# Patient Record
Sex: MALE | Race: BLACK OR AFRICAN AMERICAN | Employment: OTHER | URBAN - METROPOLITAN AREA
[De-identification: names, ages, dates, MRNs, and addresses within clinical notes are randomized per-mention and may not be internally consistent; named-entity substitution may affect disease eponyms.]

---

## 2017-05-08 ENCOUNTER — HOSPITAL ENCOUNTER (INPATIENT)
Age: 27
LOS: 11 days | Discharge: HOME | DRG: 750 | End: 2017-05-19
Attending: EMERGENCY MEDICINE | Admitting: PSYCHIATRY & NEUROLOGY
Payer: MEDICAID

## 2017-05-08 DIAGNOSIS — F20.9 SCHIZOPHRENIA, UNSPECIFIED TYPE (HCC): Primary | ICD-10-CM

## 2017-05-08 PROCEDURE — 99285 EMERGENCY DEPT VISIT HI MDM: CPT

## 2017-05-08 PROCEDURE — 6370000000 HC RX 637 (ALT 250 FOR IP): Performed by: PSYCHIATRY & NEUROLOGY

## 2017-05-08 PROCEDURE — 1240000000 HC EMOTIONAL WELLNESS R&B

## 2017-05-08 RX ORDER — TRAZODONE HYDROCHLORIDE 150 MG/1
150 TABLET ORAL NIGHTLY
Status: DISCONTINUED | OUTPATIENT
Start: 2017-05-08 | End: 2017-05-19 | Stop reason: HOSPADM

## 2017-05-08 RX ORDER — QUETIAPINE FUMARATE 100 MG/1
100 TABLET, FILM COATED ORAL NIGHTLY
COMMUNITY
End: 2017-07-19

## 2017-05-08 RX ORDER — ACETAMINOPHEN 325 MG/1
650 TABLET ORAL EVERY 6 HOURS PRN
Status: DISCONTINUED | OUTPATIENT
Start: 2017-05-08 | End: 2017-05-19 | Stop reason: HOSPADM

## 2017-05-08 RX ORDER — BENZTROPINE MESYLATE 1 MG/1
1 TABLET ORAL 2 TIMES DAILY
Status: ON HOLD | COMMUNITY
End: 2017-07-27 | Stop reason: HOSPADM

## 2017-05-08 RX ORDER — BENZTROPINE MESYLATE 1 MG/ML
2 INJECTION INTRAMUSCULAR; INTRAVENOUS DAILY PRN
Status: DISCONTINUED | OUTPATIENT
Start: 2017-05-08 | End: 2017-05-19 | Stop reason: HOSPADM

## 2017-05-08 RX ORDER — QUETIAPINE FUMARATE 100 MG/1
100 TABLET, FILM COATED ORAL NIGHTLY
Status: DISCONTINUED | OUTPATIENT
Start: 2017-05-08 | End: 2017-05-10

## 2017-05-08 RX ORDER — TRAZODONE HYDROCHLORIDE 150 MG/1
150 TABLET ORAL NIGHTLY
Status: ON HOLD | COMMUNITY
End: 2017-07-27 | Stop reason: HOSPADM

## 2017-05-08 RX ORDER — HYDROXYZINE HYDROCHLORIDE 25 MG/1
50 TABLET, FILM COATED ORAL 3 TIMES DAILY PRN
Status: DISPENSED | OUTPATIENT
Start: 2017-05-08 | End: 2017-05-11

## 2017-05-08 RX ORDER — MAGNESIUM HYDROXIDE/ALUMINUM HYDROXICE/SIMETHICONE 120; 1200; 1200 MG/30ML; MG/30ML; MG/30ML
30 SUSPENSION ORAL 2 TIMES DAILY PRN
Status: DISCONTINUED | OUTPATIENT
Start: 2017-05-08 | End: 2017-05-19 | Stop reason: HOSPADM

## 2017-05-08 RX ORDER — CITALOPRAM 20 MG/1
20 TABLET ORAL DAILY
Status: DISCONTINUED | OUTPATIENT
Start: 2017-05-08 | End: 2017-05-11

## 2017-05-08 RX ORDER — BENZTROPINE MESYLATE 1 MG/1
1 TABLET ORAL 2 TIMES DAILY
Status: DISCONTINUED | OUTPATIENT
Start: 2017-05-08 | End: 2017-05-11

## 2017-05-08 RX ORDER — CITALOPRAM 40 MG/1
40 TABLET ORAL DAILY
COMMUNITY
End: 2017-07-19

## 2017-05-08 RX ADMIN — TRAZODONE HYDROCHLORIDE 150 MG: 150 TABLET ORAL at 21:02

## 2017-05-08 RX ADMIN — BENZTROPINE MESYLATE 1 MG: 1 TABLET ORAL at 21:02

## 2017-05-08 RX ADMIN — HYDROXYZINE HYDROCHLORIDE 50 MG: 25 TABLET, FILM COATED ORAL at 21:02

## 2017-05-08 RX ADMIN — QUETIAPINE FUMARATE 100 MG: 100 TABLET, FILM COATED ORAL at 21:02

## 2017-05-08 ASSESSMENT — ENCOUNTER SYMPTOMS
SHORTNESS OF BREATH: 0
VOMITING: 0
NAUSEA: 0
ABDOMINAL PAIN: 0

## 2017-05-08 ASSESSMENT — SLEEP AND FATIGUE QUESTIONNAIRES
AVERAGE NUMBER OF SLEEP HOURS: 8
DO YOU HAVE DIFFICULTY SLEEPING: NO
DO YOU USE A SLEEP AID: NO

## 2017-05-08 ASSESSMENT — LIFESTYLE VARIABLES: HISTORY_ALCOHOL_USE: NO

## 2017-05-09 PROCEDURE — 6370000000 HC RX 637 (ALT 250 FOR IP): Performed by: PSYCHIATRY & NEUROLOGY

## 2017-05-09 PROCEDURE — 1240000000 HC EMOTIONAL WELLNESS R&B

## 2017-05-09 RX ADMIN — QUETIAPINE FUMARATE 100 MG: 100 TABLET, FILM COATED ORAL at 20:47

## 2017-05-09 RX ADMIN — BENZTROPINE MESYLATE 1 MG: 1 TABLET ORAL at 20:47

## 2017-05-09 RX ADMIN — TRAZODONE HYDROCHLORIDE 150 MG: 150 TABLET ORAL at 20:47

## 2017-05-09 RX ADMIN — HYDROXYZINE HYDROCHLORIDE 50 MG: 25 TABLET, FILM COATED ORAL at 20:47

## 2017-05-10 PROCEDURE — 6370000000 HC RX 637 (ALT 250 FOR IP): Performed by: PSYCHIATRY & NEUROLOGY

## 2017-05-10 PROCEDURE — 1240000000 HC EMOTIONAL WELLNESS R&B

## 2017-05-10 RX ORDER — QUETIAPINE FUMARATE 200 MG/1
200 TABLET, FILM COATED ORAL NIGHTLY
Status: DISCONTINUED | OUTPATIENT
Start: 2017-05-10 | End: 2017-05-11

## 2017-05-10 RX ADMIN — HYDROXYZINE HYDROCHLORIDE 50 MG: 25 TABLET, FILM COATED ORAL at 22:02

## 2017-05-10 RX ADMIN — BENZTROPINE MESYLATE 1 MG: 1 TABLET ORAL at 22:02

## 2017-05-10 RX ADMIN — QUETIAPINE FUMARATE 200 MG: 200 TABLET, FILM COATED ORAL at 22:02

## 2017-05-10 RX ADMIN — TRAZODONE HYDROCHLORIDE 150 MG: 150 TABLET ORAL at 22:02

## 2017-05-11 PROCEDURE — 1240000000 HC EMOTIONAL WELLNESS R&B

## 2017-05-11 PROCEDURE — 6370000000 HC RX 637 (ALT 250 FOR IP): Performed by: PSYCHIATRY & NEUROLOGY

## 2017-05-11 RX ORDER — BENZTROPINE MESYLATE 1 MG/1
1 TABLET ORAL NIGHTLY
Status: DISCONTINUED | OUTPATIENT
Start: 2017-05-12 | End: 2017-05-19 | Stop reason: HOSPADM

## 2017-05-11 RX ORDER — QUETIAPINE FUMARATE 300 MG/1
300 TABLET, FILM COATED ORAL NIGHTLY
Status: DISCONTINUED | OUTPATIENT
Start: 2017-05-11 | End: 2017-05-14

## 2017-05-11 RX ADMIN — QUETIAPINE FUMARATE 300 MG: 300 TABLET, FILM COATED ORAL at 21:33

## 2017-05-11 RX ADMIN — TRAZODONE HYDROCHLORIDE 150 MG: 150 TABLET ORAL at 21:33

## 2017-05-12 PROCEDURE — 6370000000 HC RX 637 (ALT 250 FOR IP): Performed by: PSYCHIATRY & NEUROLOGY

## 2017-05-12 PROCEDURE — 1240000000 HC EMOTIONAL WELLNESS R&B

## 2017-05-12 RX ADMIN — QUETIAPINE FUMARATE 300 MG: 300 TABLET, FILM COATED ORAL at 20:44

## 2017-05-12 RX ADMIN — BENZTROPINE MESYLATE 1 MG: 1 TABLET ORAL at 20:44

## 2017-05-13 PROCEDURE — 6370000000 HC RX 637 (ALT 250 FOR IP): Performed by: PSYCHIATRY & NEUROLOGY

## 2017-05-13 PROCEDURE — 1240000000 HC EMOTIONAL WELLNESS R&B

## 2017-05-13 RX ADMIN — QUETIAPINE FUMARATE 300 MG: 300 TABLET, FILM COATED ORAL at 20:30

## 2017-05-13 RX ADMIN — TRAZODONE HYDROCHLORIDE 150 MG: 150 TABLET ORAL at 20:30

## 2017-05-13 RX ADMIN — BENZTROPINE MESYLATE 1 MG: 1 TABLET ORAL at 20:30

## 2017-05-14 PROCEDURE — 1240000000 HC EMOTIONAL WELLNESS R&B

## 2017-05-14 PROCEDURE — 6370000000 HC RX 637 (ALT 250 FOR IP): Performed by: PSYCHIATRY & NEUROLOGY

## 2017-05-14 RX ORDER — HALOPERIDOL 5 MG
10 TABLET ORAL 2 TIMES DAILY PRN
Status: DISCONTINUED | OUTPATIENT
Start: 2017-05-14 | End: 2017-05-19 | Stop reason: HOSPADM

## 2017-05-14 RX ORDER — HALOPERIDOL 5 MG/ML
10 INJECTION INTRAMUSCULAR 2 TIMES DAILY PRN
Status: DISCONTINUED | OUTPATIENT
Start: 2017-05-14 | End: 2017-05-19 | Stop reason: HOSPADM

## 2017-05-14 RX ORDER — QUETIAPINE FUMARATE 200 MG/1
400 TABLET, FILM COATED ORAL NIGHTLY
Status: DISCONTINUED | OUTPATIENT
Start: 2017-05-14 | End: 2017-05-19 | Stop reason: HOSPADM

## 2017-05-14 RX ADMIN — QUETIAPINE FUMARATE 400 MG: 200 TABLET, FILM COATED ORAL at 21:35

## 2017-05-14 RX ADMIN — ACETAMINOPHEN 650 MG: 325 TABLET, FILM COATED ORAL at 21:35

## 2017-05-14 RX ADMIN — BENZTROPINE MESYLATE 1 MG: 1 TABLET ORAL at 21:36

## 2017-05-14 RX ADMIN — TRAZODONE HYDROCHLORIDE 150 MG: 150 TABLET ORAL at 21:36

## 2017-05-14 ASSESSMENT — PAIN SCALES - GENERAL
PAINLEVEL_OUTOF10: 2
PAINLEVEL_OUTOF10: 0

## 2017-05-15 PROCEDURE — 6370000000 HC RX 637 (ALT 250 FOR IP): Performed by: PSYCHIATRY & NEUROLOGY

## 2017-05-15 PROCEDURE — 1240000000 HC EMOTIONAL WELLNESS R&B

## 2017-05-15 RX ADMIN — QUETIAPINE FUMARATE 400 MG: 200 TABLET, FILM COATED ORAL at 22:13

## 2017-05-15 RX ADMIN — BENZTROPINE MESYLATE 1 MG: 1 TABLET ORAL at 22:16

## 2017-05-15 RX ADMIN — TRAZODONE HYDROCHLORIDE 150 MG: 150 TABLET ORAL at 22:13

## 2017-05-16 PROCEDURE — 6360000002 HC RX W HCPCS: Performed by: PSYCHIATRY & NEUROLOGY

## 2017-05-16 PROCEDURE — 1240000000 HC EMOTIONAL WELLNESS R&B

## 2017-05-16 RX ADMIN — HALOPERIDOL LACTATE 10 MG: 5 INJECTION, SOLUTION INTRAMUSCULAR at 22:19

## 2017-05-17 PROCEDURE — 1240000000 HC EMOTIONAL WELLNESS R&B

## 2017-05-17 PROCEDURE — 6370000000 HC RX 637 (ALT 250 FOR IP): Performed by: PSYCHIATRY & NEUROLOGY

## 2017-05-17 RX ADMIN — BENZTROPINE MESYLATE 1 MG: 1 TABLET ORAL at 22:21

## 2017-05-17 RX ADMIN — TRAZODONE HYDROCHLORIDE 150 MG: 150 TABLET ORAL at 22:21

## 2017-05-17 RX ADMIN — QUETIAPINE FUMARATE 400 MG: 200 TABLET, FILM COATED ORAL at 22:21

## 2017-05-17 RX ADMIN — ACETAMINOPHEN 650 MG: 325 TABLET, FILM COATED ORAL at 22:21

## 2017-05-17 ASSESSMENT — PAIN SCALES - GENERAL
PAINLEVEL_OUTOF10: 3
PAINLEVEL_OUTOF10: 0

## 2017-05-18 VITALS
OXYGEN SATURATION: 99 % | RESPIRATION RATE: 14 BRPM | WEIGHT: 150 LBS | BODY MASS INDEX: 17.71 KG/M2 | HEART RATE: 104 BPM | DIASTOLIC BLOOD PRESSURE: 89 MMHG | SYSTOLIC BLOOD PRESSURE: 144 MMHG | TEMPERATURE: 98.4 F | HEIGHT: 77 IN

## 2017-05-18 LAB
AMPHETAMINE SCREEN URINE: NEGATIVE
BARBITURATE SCREEN URINE: NEGATIVE
BENZODIAZEPINE SCREEN, URINE: NEGATIVE
BILIRUBIN URINE: NEGATIVE
BUPRENORPHINE URINE: NORMAL
CANNABINOID SCREEN URINE: NEGATIVE
COCAINE METABOLITE, URINE: NEGATIVE
COLOR: YELLOW
COMMENT UA: NORMAL
GLUCOSE URINE: NEGATIVE
KETONES, URINE: NEGATIVE
LEUKOCYTE ESTERASE, URINE: NEGATIVE
MDMA URINE: NORMAL
METHADONE SCREEN, URINE: NEGATIVE
METHAMPHETAMINE, URINE: NORMAL
NITRITE, URINE: NEGATIVE
OPIATES, URINE: NEGATIVE
OXYCODONE SCREEN URINE: NEGATIVE
PH UA: 7 (ref 5–8)
PHENCYCLIDINE, URINE: NEGATIVE
PROPOXYPHENE, URINE: NORMAL
PROTEIN UA: NEGATIVE
SPECIFIC GRAVITY UA: 1.01 (ref 1–1.03)
TEST INFORMATION: NORMAL
TRICYCLIC ANTIDEPRESSANTS, UR: NORMAL
TURBIDITY: CLEAR
URINE HGB: NEGATIVE
UROBILINOGEN, URINE: NORMAL

## 2017-05-18 PROCEDURE — 6370000000 HC RX 637 (ALT 250 FOR IP): Performed by: PSYCHIATRY & NEUROLOGY

## 2017-05-18 PROCEDURE — 81003 URINALYSIS AUTO W/O SCOPE: CPT

## 2017-05-18 PROCEDURE — 1240000000 HC EMOTIONAL WELLNESS R&B

## 2017-05-18 PROCEDURE — 80307 DRUG TEST PRSMV CHEM ANLYZR: CPT

## 2017-05-18 RX ADMIN — BENZTROPINE MESYLATE 1 MG: 1 TABLET ORAL at 21:01

## 2017-05-18 RX ADMIN — QUETIAPINE FUMARATE 400 MG: 200 TABLET, FILM COATED ORAL at 21:01

## 2017-05-18 RX ADMIN — TRAZODONE HYDROCHLORIDE 150 MG: 150 TABLET ORAL at 21:01

## 2017-07-17 ENCOUNTER — APPOINTMENT (OUTPATIENT)
Dept: GENERAL RADIOLOGY | Age: 27
End: 2017-07-17
Payer: COMMERCIAL

## 2017-07-17 ENCOUNTER — HOSPITAL ENCOUNTER (EMERGENCY)
Age: 27
Discharge: HOME | End: 2017-07-18
Attending: EMERGENCY MEDICINE
Payer: COMMERCIAL

## 2017-07-17 VITALS
TEMPERATURE: 97.3 F | HEART RATE: 91 BPM | RESPIRATION RATE: 14 BRPM | HEIGHT: 77 IN | DIASTOLIC BLOOD PRESSURE: 76 MMHG | OXYGEN SATURATION: 97 % | BODY MASS INDEX: 19.72 KG/M2 | SYSTOLIC BLOOD PRESSURE: 123 MMHG | WEIGHT: 167 LBS

## 2017-07-17 DIAGNOSIS — S93.401A SPRAIN OF RIGHT ANKLE, UNSPECIFIED LIGAMENT, INITIAL ENCOUNTER: Primary | ICD-10-CM

## 2017-07-17 PROCEDURE — 6370000000 HC RX 637 (ALT 250 FOR IP): Performed by: EMERGENCY MEDICINE

## 2017-07-17 PROCEDURE — 73610 X-RAY EXAM OF ANKLE: CPT

## 2017-07-17 PROCEDURE — 73630 X-RAY EXAM OF FOOT: CPT

## 2017-07-17 PROCEDURE — 99283 EMERGENCY DEPT VISIT LOW MDM: CPT

## 2017-07-17 RX ORDER — IBUPROFEN 800 MG/1
800 TABLET ORAL EVERY 8 HOURS PRN
Qty: 30 TABLET | Refills: 0 | Status: SHIPPED | OUTPATIENT
Start: 2017-07-17 | End: 2017-07-19

## 2017-07-17 RX ORDER — IBUPROFEN 800 MG/1
800 TABLET ORAL ONCE
Status: COMPLETED | OUTPATIENT
Start: 2017-07-17 | End: 2017-07-17

## 2017-07-17 RX ADMIN — IBUPROFEN 800 MG: 800 TABLET ORAL at 23:27

## 2017-07-17 ASSESSMENT — PAIN DESCRIPTION - PAIN TYPE: TYPE: ACUTE PAIN

## 2017-07-17 ASSESSMENT — PAIN DESCRIPTION - ONSET: ONSET: ON-GOING

## 2017-07-17 ASSESSMENT — PAIN DESCRIPTION - DESCRIPTORS: DESCRIPTORS: ACHING

## 2017-07-17 ASSESSMENT — PAIN DESCRIPTION - PROGRESSION: CLINICAL_PROGRESSION: GRADUALLY WORSENING

## 2017-07-17 ASSESSMENT — PAIN SCALES - GENERAL
PAINLEVEL_OUTOF10: 5
PAINLEVEL_OUTOF10: 5

## 2017-07-17 ASSESSMENT — PAIN DESCRIPTION - LOCATION: LOCATION: ANKLE

## 2017-07-17 ASSESSMENT — PAIN DESCRIPTION - ORIENTATION: ORIENTATION: RIGHT

## 2017-07-19 ENCOUNTER — HOSPITAL ENCOUNTER (INPATIENT)
Age: 27
LOS: 9 days | Discharge: HOME | DRG: 750 | End: 2017-07-28
Attending: EMERGENCY MEDICINE | Admitting: PSYCHIATRY & NEUROLOGY
Payer: COMMERCIAL

## 2017-07-19 DIAGNOSIS — F25.9 SCHIZOAFFECTIVE DISORDER, UNSPECIFIED TYPE (HCC): Primary | ICD-10-CM

## 2017-07-19 PROCEDURE — 1240000000 HC EMOTIONAL WELLNESS R&B

## 2017-07-19 PROCEDURE — 99285 EMERGENCY DEPT VISIT HI MDM: CPT

## 2017-07-19 RX ORDER — QUETIAPINE FUMARATE 200 MG/1
200 TABLET, FILM COATED ORAL DAILY
Status: ON HOLD | COMMUNITY
End: 2017-07-27 | Stop reason: HOSPADM

## 2017-07-19 RX ORDER — PALIPERIDONE 3 MG/1
3 TABLET, EXTENDED RELEASE ORAL EVERY MORNING
Status: ON HOLD | COMMUNITY
End: 2017-07-27 | Stop reason: HOSPADM

## 2017-07-19 ASSESSMENT — ENCOUNTER SYMPTOMS
EYE PAIN: 0
EYE DISCHARGE: 0
BACK PAIN: 0
SORE THROAT: 0
SHORTNESS OF BREATH: 0
TROUBLE SWALLOWING: 0
RHINORRHEA: 0
DIARRHEA: 0
CONSTIPATION: 0
BLOOD IN STOOL: 0
COLOR CHANGE: 0
ABDOMINAL PAIN: 0
EYE REDNESS: 0
FACIAL SWELLING: 0
NAUSEA: 0
COUGH: 0
VOMITING: 0
SINUS PRESSURE: 0
CHEST TIGHTNESS: 0
WHEEZING: 0

## 2017-07-19 ASSESSMENT — SLEEP AND FATIGUE QUESTIONNAIRES
DIFFICULTY ARISING: NO
RESTFUL SLEEP: YES
DIFFICULTY STAYING ASLEEP: NO
DIFFICULTY FALLING ASLEEP: YES
AVERAGE NUMBER OF SLEEP HOURS: 8
DO YOU HAVE DIFFICULTY SLEEPING: YES
DO YOU USE A SLEEP AID: YES
SLEEP PATTERN: NORMAL

## 2017-07-19 ASSESSMENT — LIFESTYLE VARIABLES: HISTORY_ALCOHOL_USE: NO

## 2017-07-20 PROCEDURE — 6370000000 HC RX 637 (ALT 250 FOR IP): Performed by: PSYCHIATRY & NEUROLOGY

## 2017-07-20 PROCEDURE — 1240000000 HC EMOTIONAL WELLNESS R&B

## 2017-07-20 PROCEDURE — 81003 URINALYSIS AUTO W/O SCOPE: CPT

## 2017-07-20 PROCEDURE — 80307 DRUG TEST PRSMV CHEM ANLYZR: CPT

## 2017-07-20 RX ORDER — BENZTROPINE MESYLATE 1 MG/ML
2 INJECTION INTRAMUSCULAR; INTRAVENOUS DAILY PRN
Status: DISCONTINUED | OUTPATIENT
Start: 2017-07-20 | End: 2017-07-28 | Stop reason: HOSPADM

## 2017-07-20 RX ORDER — ACETAMINOPHEN 325 MG/1
650 TABLET ORAL EVERY 4 HOURS PRN
Status: DISCONTINUED | OUTPATIENT
Start: 2017-07-20 | End: 2017-07-28 | Stop reason: HOSPADM

## 2017-07-20 RX ORDER — TRAZODONE HYDROCHLORIDE 150 MG/1
150 TABLET ORAL NIGHTLY
Status: DISCONTINUED | OUTPATIENT
Start: 2017-07-20 | End: 2017-07-28 | Stop reason: HOSPADM

## 2017-07-20 RX ORDER — HYDROXYZINE HYDROCHLORIDE 25 MG/1
25 TABLET, FILM COATED ORAL 3 TIMES DAILY PRN
Status: DISCONTINUED | OUTPATIENT
Start: 2017-07-20 | End: 2017-07-28 | Stop reason: HOSPADM

## 2017-07-20 RX ORDER — BENZTROPINE MESYLATE 1 MG/1
1 TABLET ORAL 2 TIMES DAILY
Status: DISCONTINUED | OUTPATIENT
Start: 2017-07-20 | End: 2017-07-28 | Stop reason: HOSPADM

## 2017-07-20 RX ORDER — QUETIAPINE FUMARATE 200 MG/1
200 TABLET, FILM COATED ORAL DAILY
Status: DISCONTINUED | OUTPATIENT
Start: 2017-07-20 | End: 2017-07-28 | Stop reason: HOSPADM

## 2017-07-20 RX ADMIN — BENZTROPINE MESYLATE 1 MG: 1 TABLET ORAL at 20:14

## 2017-07-20 RX ADMIN — BENZTROPINE MESYLATE 1 MG: 1 TABLET ORAL at 14:12

## 2017-07-20 RX ADMIN — QUETIAPINE FUMARATE 200 MG: 200 TABLET, FILM COATED ORAL at 14:12

## 2017-07-20 RX ADMIN — HYDROXYZINE HYDROCHLORIDE 25 MG: 25 TABLET, FILM COATED ORAL at 20:14

## 2017-07-20 RX ADMIN — TRAZODONE HYDROCHLORIDE 150 MG: 150 TABLET ORAL at 20:14

## 2017-07-21 PROCEDURE — 6370000000 HC RX 637 (ALT 250 FOR IP): Performed by: PSYCHIATRY & NEUROLOGY

## 2017-07-21 PROCEDURE — 1240000000 HC EMOTIONAL WELLNESS R&B

## 2017-07-21 RX ADMIN — QUETIAPINE FUMARATE 200 MG: 200 TABLET, FILM COATED ORAL at 08:06

## 2017-07-21 RX ADMIN — BENZTROPINE MESYLATE 1 MG: 1 TABLET ORAL at 08:06

## 2017-07-21 RX ADMIN — BENZTROPINE MESYLATE 1 MG: 1 TABLET ORAL at 21:15

## 2017-07-21 RX ADMIN — HYDROXYZINE HYDROCHLORIDE 25 MG: 25 TABLET, FILM COATED ORAL at 21:15

## 2017-07-21 RX ADMIN — TRAZODONE HYDROCHLORIDE 150 MG: 150 TABLET ORAL at 21:15

## 2017-07-22 LAB
AMPHETAMINE SCREEN URINE: NEGATIVE
BARBITURATE SCREEN URINE: NEGATIVE
BENZODIAZEPINE SCREEN, URINE: NEGATIVE
BILIRUBIN URINE: NEGATIVE
BUPRENORPHINE URINE: NORMAL
CANNABINOID SCREEN URINE: NEGATIVE
COCAINE METABOLITE, URINE: NEGATIVE
COLOR: YELLOW
COMMENT UA: NORMAL
GLUCOSE URINE: NEGATIVE
KETONES, URINE: NEGATIVE
LEUKOCYTE ESTERASE, URINE: NEGATIVE
MDMA URINE: NORMAL
METHADONE SCREEN, URINE: NEGATIVE
METHAMPHETAMINE, URINE: NORMAL
NITRITE, URINE: NEGATIVE
OPIATES, URINE: NEGATIVE
OXYCODONE SCREEN URINE: NEGATIVE
PH UA: 8 (ref 5–8)
PHENCYCLIDINE, URINE: NEGATIVE
PROPOXYPHENE, URINE: NORMAL
PROTEIN UA: NEGATIVE
SPECIFIC GRAVITY UA: 1.02 (ref 1–1.03)
TEST INFORMATION: NORMAL
TRICYCLIC ANTIDEPRESSANTS, UR: NORMAL
TURBIDITY: CLEAR
URINE HGB: NEGATIVE
UROBILINOGEN, URINE: NORMAL

## 2017-07-22 PROCEDURE — 6370000000 HC RX 637 (ALT 250 FOR IP): Performed by: PSYCHIATRY & NEUROLOGY

## 2017-07-22 PROCEDURE — 1240000000 HC EMOTIONAL WELLNESS R&B

## 2017-07-22 RX ADMIN — TRAZODONE HYDROCHLORIDE 150 MG: 150 TABLET ORAL at 21:20

## 2017-07-22 RX ADMIN — QUETIAPINE FUMARATE 200 MG: 200 TABLET, FILM COATED ORAL at 09:22

## 2017-07-22 RX ADMIN — HYDROXYZINE HYDROCHLORIDE 25 MG: 25 TABLET, FILM COATED ORAL at 21:20

## 2017-07-22 RX ADMIN — BENZTROPINE MESYLATE 1 MG: 1 TABLET ORAL at 09:22

## 2017-07-22 RX ADMIN — BENZTROPINE MESYLATE 1 MG: 1 TABLET ORAL at 21:20

## 2017-07-23 PROCEDURE — 1240000000 HC EMOTIONAL WELLNESS R&B

## 2017-07-23 PROCEDURE — 6370000000 HC RX 637 (ALT 250 FOR IP): Performed by: PSYCHIATRY & NEUROLOGY

## 2017-07-23 RX ADMIN — BENZTROPINE MESYLATE 1 MG: 1 TABLET ORAL at 08:25

## 2017-07-23 RX ADMIN — TRAZODONE HYDROCHLORIDE 150 MG: 150 TABLET ORAL at 22:44

## 2017-07-23 RX ADMIN — BENZTROPINE MESYLATE 1 MG: 1 TABLET ORAL at 22:44

## 2017-07-23 RX ADMIN — QUETIAPINE FUMARATE 200 MG: 200 TABLET, FILM COATED ORAL at 08:25

## 2017-07-23 ASSESSMENT — PAIN SCALES - GENERAL: PAINLEVEL_OUTOF10: 0

## 2017-07-24 PROCEDURE — 6370000000 HC RX 637 (ALT 250 FOR IP): Performed by: PSYCHIATRY & NEUROLOGY

## 2017-07-24 PROCEDURE — 1240000000 HC EMOTIONAL WELLNESS R&B

## 2017-07-24 RX ADMIN — HYDROXYZINE HYDROCHLORIDE 25 MG: 25 TABLET, FILM COATED ORAL at 22:09

## 2017-07-24 RX ADMIN — TRAZODONE HYDROCHLORIDE 150 MG: 150 TABLET ORAL at 22:09

## 2017-07-24 RX ADMIN — QUETIAPINE FUMARATE 200 MG: 200 TABLET, FILM COATED ORAL at 08:10

## 2017-07-24 RX ADMIN — BENZTROPINE MESYLATE 1 MG: 1 TABLET ORAL at 22:09

## 2017-07-24 RX ADMIN — BENZTROPINE MESYLATE 1 MG: 1 TABLET ORAL at 08:10

## 2017-07-25 PROCEDURE — 6370000000 HC RX 637 (ALT 250 FOR IP): Performed by: PSYCHIATRY & NEUROLOGY

## 2017-07-25 PROCEDURE — 1240000000 HC EMOTIONAL WELLNESS R&B

## 2017-07-25 RX ADMIN — HYDROXYZINE HYDROCHLORIDE 25 MG: 25 TABLET, FILM COATED ORAL at 21:59

## 2017-07-25 RX ADMIN — QUETIAPINE FUMARATE 200 MG: 200 TABLET, FILM COATED ORAL at 08:44

## 2017-07-25 RX ADMIN — BENZTROPINE MESYLATE 1 MG: 1 TABLET ORAL at 21:58

## 2017-07-25 RX ADMIN — TRAZODONE HYDROCHLORIDE 150 MG: 150 TABLET ORAL at 21:58

## 2017-07-25 RX ADMIN — BENZTROPINE MESYLATE 1 MG: 1 TABLET ORAL at 08:44

## 2017-07-26 PROCEDURE — 1240000000 HC EMOTIONAL WELLNESS R&B

## 2017-07-26 PROCEDURE — 6370000000 HC RX 637 (ALT 250 FOR IP): Performed by: PSYCHIATRY & NEUROLOGY

## 2017-07-26 RX ADMIN — BENZTROPINE MESYLATE 1 MG: 1 TABLET ORAL at 08:52

## 2017-07-26 RX ADMIN — QUETIAPINE FUMARATE 200 MG: 200 TABLET, FILM COATED ORAL at 08:52

## 2017-07-26 RX ADMIN — TRAZODONE HYDROCHLORIDE 150 MG: 150 TABLET ORAL at 22:51

## 2017-07-26 RX ADMIN — HYDROXYZINE HYDROCHLORIDE 25 MG: 25 TABLET, FILM COATED ORAL at 08:51

## 2017-07-26 RX ADMIN — BENZTROPINE MESYLATE 1 MG: 1 TABLET ORAL at 22:50

## 2017-07-27 VITALS
BODY MASS INDEX: 19.86 KG/M2 | TEMPERATURE: 97.9 F | OXYGEN SATURATION: 96 % | HEIGHT: 77 IN | HEART RATE: 82 BPM | WEIGHT: 168.2 LBS | SYSTOLIC BLOOD PRESSURE: 111 MMHG | RESPIRATION RATE: 14 BRPM | DIASTOLIC BLOOD PRESSURE: 70 MMHG

## 2017-07-27 PROCEDURE — 6370000000 HC RX 637 (ALT 250 FOR IP): Performed by: PSYCHIATRY & NEUROLOGY

## 2017-07-27 PROCEDURE — 1240000000 HC EMOTIONAL WELLNESS R&B

## 2017-07-27 RX ORDER — BENZTROPINE MESYLATE 1 MG/1
1 TABLET ORAL 2 TIMES DAILY
Qty: 60 TABLET | Refills: 0 | Status: SHIPPED | OUTPATIENT
Start: 2017-07-27 | End: 2017-09-12

## 2017-07-27 RX ORDER — TRAZODONE HYDROCHLORIDE 150 MG/1
150 TABLET ORAL NIGHTLY
Qty: 30 TABLET | Refills: 0 | Status: SHIPPED | OUTPATIENT
Start: 2017-07-27 | End: 2017-09-12

## 2017-07-27 RX ORDER — QUETIAPINE FUMARATE 200 MG/1
200 TABLET, FILM COATED ORAL DAILY
Qty: 30 TABLET | Refills: 0 | Status: SHIPPED | OUTPATIENT
Start: 2017-07-27 | End: 2017-09-12

## 2017-07-27 RX ADMIN — BENZTROPINE MESYLATE 1 MG: 1 TABLET ORAL at 21:05

## 2017-07-27 RX ADMIN — HYDROXYZINE HYDROCHLORIDE 25 MG: 25 TABLET, FILM COATED ORAL at 15:57

## 2017-07-27 RX ADMIN — TRAZODONE HYDROCHLORIDE 150 MG: 150 TABLET ORAL at 21:05

## 2017-07-27 RX ADMIN — HYDROXYZINE HYDROCHLORIDE 25 MG: 25 TABLET, FILM COATED ORAL at 21:05

## 2017-07-27 RX ADMIN — BENZTROPINE MESYLATE 1 MG: 1 TABLET ORAL at 08:18

## 2017-07-27 RX ADMIN — QUETIAPINE FUMARATE 200 MG: 200 TABLET, FILM COATED ORAL at 08:18

## 2017-07-27 ASSESSMENT — PAIN SCALES - GENERAL: PAINLEVEL_OUTOF10: 0

## 2017-07-28 PROCEDURE — 5130000000 HC BRIDGE APPOINTMENT

## 2017-07-28 PROCEDURE — 6370000000 HC RX 637 (ALT 250 FOR IP): Performed by: PSYCHIATRY & NEUROLOGY

## 2017-07-28 RX ADMIN — BENZTROPINE MESYLATE 1 MG: 1 TABLET ORAL at 08:42

## 2017-07-28 RX ADMIN — QUETIAPINE FUMARATE 200 MG: 200 TABLET, FILM COATED ORAL at 08:42

## 2017-08-10 ENCOUNTER — HOSPITAL ENCOUNTER (EMERGENCY)
Age: 27
Discharge: HOME | End: 2017-08-10
Payer: COMMERCIAL

## 2017-08-10 VITALS
WEIGHT: 165 LBS | HEART RATE: 57 BPM | BODY MASS INDEX: 19.48 KG/M2 | SYSTOLIC BLOOD PRESSURE: 126 MMHG | RESPIRATION RATE: 16 BRPM | OXYGEN SATURATION: 99 % | DIASTOLIC BLOOD PRESSURE: 81 MMHG | TEMPERATURE: 97.3 F | HEIGHT: 77 IN

## 2017-08-10 DIAGNOSIS — M79.671 PAIN IN BOTH FEET: Primary | ICD-10-CM

## 2017-08-10 DIAGNOSIS — M79.672 PAIN IN BOTH FEET: Primary | ICD-10-CM

## 2017-08-10 PROCEDURE — 6370000000 HC RX 637 (ALT 250 FOR IP): Performed by: EMERGENCY MEDICINE

## 2017-08-10 PROCEDURE — 99283 EMERGENCY DEPT VISIT LOW MDM: CPT

## 2017-08-10 RX ORDER — IBUPROFEN 800 MG/1
800 TABLET ORAL ONCE
Status: COMPLETED | OUTPATIENT
Start: 2017-08-10 | End: 2017-08-10

## 2017-08-10 RX ORDER — IBUPROFEN 800 MG/1
800 TABLET ORAL EVERY 8 HOURS PRN
Qty: 30 TABLET | Refills: 0 | Status: ON HOLD | OUTPATIENT
Start: 2017-08-10 | End: 2020-05-26

## 2017-08-10 RX ADMIN — IBUPROFEN 800 MG: 800 TABLET, FILM COATED ORAL at 04:26

## 2017-08-10 ASSESSMENT — PAIN DESCRIPTION - LOCATION: LOCATION: FOOT

## 2017-08-10 ASSESSMENT — PAIN DESCRIPTION - FREQUENCY: FREQUENCY: CONTINUOUS

## 2017-08-10 ASSESSMENT — PAIN SCALES - GENERAL
PAINLEVEL_OUTOF10: 5
PAINLEVEL_OUTOF10: 5

## 2017-08-10 ASSESSMENT — PAIN DESCRIPTION - PAIN TYPE: TYPE: ACUTE PAIN

## 2017-08-10 ASSESSMENT — PAIN DESCRIPTION - ONSET: ONSET: SUDDEN

## 2017-08-10 ASSESSMENT — PAIN DESCRIPTION - DESCRIPTORS: DESCRIPTORS: ACHING

## 2017-08-10 ASSESSMENT — PAIN DESCRIPTION - ORIENTATION: ORIENTATION: RIGHT;LEFT

## 2017-09-04 ENCOUNTER — HOSPITAL ENCOUNTER (EMERGENCY)
Age: 27
Discharge: HOME | End: 2017-09-04
Attending: EMERGENCY MEDICINE
Payer: COMMERCIAL

## 2017-09-04 VITALS
OXYGEN SATURATION: 96 % | HEART RATE: 99 BPM | TEMPERATURE: 97.2 F | RESPIRATION RATE: 18 BRPM | WEIGHT: 165 LBS | BODY MASS INDEX: 19.57 KG/M2 | DIASTOLIC BLOOD PRESSURE: 84 MMHG | SYSTOLIC BLOOD PRESSURE: 134 MMHG

## 2017-09-04 DIAGNOSIS — R52 BODY ACHES: Primary | ICD-10-CM

## 2017-09-04 DIAGNOSIS — T07.XXXA MULTIPLE ABRASIONS: ICD-10-CM

## 2017-09-04 PROCEDURE — 99283 EMERGENCY DEPT VISIT LOW MDM: CPT

## 2017-09-04 ASSESSMENT — PAIN DESCRIPTION - PAIN TYPE: TYPE: ACUTE PAIN

## 2017-09-04 ASSESSMENT — ENCOUNTER SYMPTOMS
ABDOMINAL PAIN: 0
VOMITING: 0
DIARRHEA: 0
COUGH: 0
RHINORRHEA: 0
SHORTNESS OF BREATH: 0
NAUSEA: 0

## 2017-09-04 ASSESSMENT — PAIN DESCRIPTION - DESCRIPTORS: DESCRIPTORS: DISCOMFORT

## 2017-09-04 ASSESSMENT — PAIN DESCRIPTION - LOCATION: LOCATION: GENERALIZED

## 2017-09-04 ASSESSMENT — PAIN SCALES - GENERAL: PAINLEVEL_OUTOF10: 8

## 2017-09-12 ENCOUNTER — HOSPITAL ENCOUNTER (INPATIENT)
Age: 27
LOS: 7 days | Discharge: HOME | DRG: 885 | End: 2017-09-19
Attending: EMERGENCY MEDICINE | Admitting: PSYCHIATRY & NEUROLOGY
Payer: MEDICARE

## 2017-09-12 DIAGNOSIS — R44.0 AUDITORY HALLUCINATIONS: Primary | ICD-10-CM

## 2017-09-12 DIAGNOSIS — R45.850 HOMICIDAL IDEATIONS: ICD-10-CM

## 2017-09-12 DIAGNOSIS — R45.851 SUICIDAL IDEATIONS: ICD-10-CM

## 2017-09-12 PROCEDURE — 1240000000 HC EMOTIONAL WELLNESS R&B

## 2017-09-12 PROCEDURE — 6370000000 HC RX 637 (ALT 250 FOR IP): Performed by: PSYCHIATRY & NEUROLOGY

## 2017-09-12 PROCEDURE — 99285 EMERGENCY DEPT VISIT HI MDM: CPT

## 2017-09-12 RX ORDER — BENZTROPINE MESYLATE 1 MG/1
1 TABLET ORAL 2 TIMES DAILY
Status: ON HOLD | COMMUNITY
End: 2017-09-18 | Stop reason: HOSPADM

## 2017-09-12 RX ORDER — BENZTROPINE MESYLATE 1 MG/1
1 TABLET ORAL 2 TIMES DAILY
Status: DISCONTINUED | OUTPATIENT
Start: 2017-09-12 | End: 2017-09-19 | Stop reason: HOSPADM

## 2017-09-12 RX ORDER — TRAZODONE HYDROCHLORIDE 150 MG/1
150 TABLET ORAL NIGHTLY
Status: ON HOLD | COMMUNITY
End: 2017-09-18 | Stop reason: HOSPADM

## 2017-09-12 RX ORDER — TRAZODONE HYDROCHLORIDE 150 MG/1
150 TABLET ORAL NIGHTLY PRN
Status: DISCONTINUED | OUTPATIENT
Start: 2017-09-12 | End: 2017-09-19 | Stop reason: HOSPADM

## 2017-09-12 RX ORDER — PALIPERIDONE 9 MG/1
9 TABLET, EXTENDED RELEASE ORAL EVERY MORNING
Status: ON HOLD | COMMUNITY
End: 2017-09-18 | Stop reason: HOSPADM

## 2017-09-12 RX ORDER — PALIPERIDONE 9 MG/1
9 TABLET, EXTENDED RELEASE ORAL DAILY
Status: DISCONTINUED | OUTPATIENT
Start: 2017-09-12 | End: 2017-09-19 | Stop reason: HOSPADM

## 2017-09-12 RX ADMIN — TRAZODONE HYDROCHLORIDE 150 MG: 150 TABLET ORAL at 21:50

## 2017-09-12 RX ADMIN — PALIPERIDONE 9 MG: 9 TABLET, EXTENDED RELEASE ORAL at 21:50

## 2017-09-12 RX ADMIN — BENZTROPINE MESYLATE 1 MG: 1 TABLET ORAL at 21:50

## 2017-09-12 ASSESSMENT — SLEEP AND FATIGUE QUESTIONNAIRES
DO YOU USE A SLEEP AID: NO
AVERAGE NUMBER OF SLEEP HOURS: 8
DO YOU HAVE DIFFICULTY SLEEPING: NO

## 2017-09-12 ASSESSMENT — LIFESTYLE VARIABLES: HISTORY_ALCOHOL_USE: NO

## 2017-09-13 PROCEDURE — 1240000000 HC EMOTIONAL WELLNESS R&B

## 2017-09-13 RX ORDER — HYDROXYZINE HYDROCHLORIDE 25 MG/1
25 TABLET, FILM COATED ORAL 3 TIMES DAILY PRN
Status: DISCONTINUED | OUTPATIENT
Start: 2017-09-13 | End: 2017-09-19 | Stop reason: HOSPADM

## 2017-09-13 RX ORDER — ACETAMINOPHEN 325 MG/1
650 TABLET ORAL EVERY 4 HOURS PRN
Status: DISCONTINUED | OUTPATIENT
Start: 2017-09-13 | End: 2017-09-19 | Stop reason: HOSPADM

## 2017-09-13 RX ORDER — BENZTROPINE MESYLATE 1 MG/ML
2 INJECTION INTRAMUSCULAR; INTRAVENOUS DAILY PRN
Status: DISCONTINUED | OUTPATIENT
Start: 2017-09-13 | End: 2017-09-19 | Stop reason: HOSPADM

## 2017-09-13 ASSESSMENT — SLEEP AND FATIGUE QUESTIONNAIRES
DO YOU USE A SLEEP AID: NO
AVERAGE NUMBER OF SLEEP HOURS: 8
DO YOU HAVE DIFFICULTY SLEEPING: NO

## 2017-09-13 ASSESSMENT — LIFESTYLE VARIABLES: HISTORY_ALCOHOL_USE: NO

## 2017-09-14 PROCEDURE — 6370000000 HC RX 637 (ALT 250 FOR IP): Performed by: PSYCHIATRY & NEUROLOGY

## 2017-09-14 PROCEDURE — 1240000000 HC EMOTIONAL WELLNESS R&B

## 2017-09-14 RX ADMIN — PALIPERIDONE 9 MG: 9 TABLET, EXTENDED RELEASE ORAL at 08:28

## 2017-09-14 RX ADMIN — BENZTROPINE MESYLATE 1 MG: 1 TABLET ORAL at 22:58

## 2017-09-14 RX ADMIN — HYDROXYZINE HYDROCHLORIDE 25 MG: 25 TABLET, FILM COATED ORAL at 22:58

## 2017-09-14 RX ADMIN — TRAZODONE HYDROCHLORIDE 150 MG: 150 TABLET ORAL at 22:58

## 2017-09-14 RX ADMIN — BENZTROPINE MESYLATE 1 MG: 1 TABLET ORAL at 08:28

## 2017-09-15 PROCEDURE — 6360000002 HC RX W HCPCS: Performed by: PSYCHIATRY & NEUROLOGY

## 2017-09-15 PROCEDURE — 1240000000 HC EMOTIONAL WELLNESS R&B

## 2017-09-15 PROCEDURE — 6370000000 HC RX 637 (ALT 250 FOR IP): Performed by: PSYCHIATRY & NEUROLOGY

## 2017-09-15 RX ADMIN — HYDROXYZINE HYDROCHLORIDE 25 MG: 25 TABLET, FILM COATED ORAL at 22:58

## 2017-09-15 RX ADMIN — BENZTROPINE MESYLATE 1 MG: 1 TABLET ORAL at 08:27

## 2017-09-15 RX ADMIN — PALIPERIDONE PALMITATE 156 MG: 156 INJECTION INTRAMUSCULAR at 11:03

## 2017-09-15 RX ADMIN — BENZTROPINE MESYLATE 1 MG: 1 TABLET ORAL at 22:58

## 2017-09-15 RX ADMIN — PALIPERIDONE 9 MG: 9 TABLET, EXTENDED RELEASE ORAL at 08:27

## 2017-09-15 RX ADMIN — TRAZODONE HYDROCHLORIDE 150 MG: 150 TABLET ORAL at 22:58

## 2017-09-16 PROCEDURE — 1240000000 HC EMOTIONAL WELLNESS R&B

## 2017-09-16 PROCEDURE — 6370000000 HC RX 637 (ALT 250 FOR IP): Performed by: PSYCHIATRY & NEUROLOGY

## 2017-09-16 RX ADMIN — TRAZODONE HYDROCHLORIDE 150 MG: 150 TABLET ORAL at 20:26

## 2017-09-16 RX ADMIN — PALIPERIDONE 9 MG: 9 TABLET, EXTENDED RELEASE ORAL at 09:02

## 2017-09-16 RX ADMIN — BENZTROPINE MESYLATE 1 MG: 1 TABLET ORAL at 20:27

## 2017-09-16 RX ADMIN — BENZTROPINE MESYLATE 1 MG: 1 TABLET ORAL at 09:01

## 2017-09-17 PROCEDURE — 6370000000 HC RX 637 (ALT 250 FOR IP): Performed by: PSYCHIATRY & NEUROLOGY

## 2017-09-17 PROCEDURE — 1240000000 HC EMOTIONAL WELLNESS R&B

## 2017-09-17 RX ADMIN — BENZTROPINE MESYLATE 1 MG: 1 TABLET ORAL at 21:43

## 2017-09-17 RX ADMIN — PALIPERIDONE 9 MG: 9 TABLET, EXTENDED RELEASE ORAL at 09:00

## 2017-09-17 RX ADMIN — BENZTROPINE MESYLATE 1 MG: 1 TABLET ORAL at 09:00

## 2017-09-17 RX ADMIN — TRAZODONE HYDROCHLORIDE 150 MG: 150 TABLET ORAL at 21:43

## 2017-09-17 RX ADMIN — HYDROXYZINE HYDROCHLORIDE 25 MG: 25 TABLET, FILM COATED ORAL at 09:00

## 2017-09-18 PROCEDURE — 1240000000 HC EMOTIONAL WELLNESS R&B

## 2017-09-18 PROCEDURE — 6370000000 HC RX 637 (ALT 250 FOR IP): Performed by: PSYCHIATRY & NEUROLOGY

## 2017-09-18 RX ORDER — BENZTROPINE MESYLATE 1 MG/1
1 TABLET ORAL 2 TIMES DAILY
Qty: 60 TABLET | Refills: 0 | Status: ON HOLD | OUTPATIENT
Start: 2017-09-18 | End: 2020-06-04 | Stop reason: HOSPADM

## 2017-09-18 RX ORDER — TRAZODONE HYDROCHLORIDE 150 MG/1
150 TABLET ORAL NIGHTLY PRN
Qty: 30 TABLET | Refills: 0 | Status: ON HOLD | OUTPATIENT
Start: 2017-09-18 | End: 2020-05-26

## 2017-09-18 RX ORDER — PALIPERIDONE 9 MG/1
9 TABLET, EXTENDED RELEASE ORAL DAILY
Qty: 30 TABLET | Refills: 0 | Status: ON HOLD | OUTPATIENT
Start: 2017-09-18 | End: 2020-05-26 | Stop reason: ALTCHOICE

## 2017-09-18 RX ADMIN — PALIPERIDONE 9 MG: 9 TABLET, EXTENDED RELEASE ORAL at 08:28

## 2017-09-18 RX ADMIN — BENZTROPINE MESYLATE 1 MG: 1 TABLET ORAL at 08:28

## 2017-09-19 VITALS
BODY MASS INDEX: 19.48 KG/M2 | TEMPERATURE: 98.2 F | OXYGEN SATURATION: 97 % | WEIGHT: 165 LBS | SYSTOLIC BLOOD PRESSURE: 120 MMHG | DIASTOLIC BLOOD PRESSURE: 66 MMHG | RESPIRATION RATE: 16 BRPM | HEART RATE: 110 BPM | HEIGHT: 77 IN

## 2017-09-19 PROCEDURE — 6370000000 HC RX 637 (ALT 250 FOR IP): Performed by: PSYCHIATRY & NEUROLOGY

## 2017-09-19 PROCEDURE — 5130000000 HC BRIDGE APPOINTMENT: Performed by: COUNSELOR

## 2017-09-19 RX ADMIN — PALIPERIDONE 9 MG: 9 TABLET, EXTENDED RELEASE ORAL at 08:09

## 2017-09-19 RX ADMIN — BENZTROPINE MESYLATE 1 MG: 1 TABLET ORAL at 08:09

## 2017-10-03 ENCOUNTER — HOSPITAL ENCOUNTER (EMERGENCY)
Age: 27
Discharge: HOME | End: 2017-10-04
Attending: EMERGENCY MEDICINE
Payer: COMMERCIAL

## 2017-10-03 ENCOUNTER — APPOINTMENT (OUTPATIENT)
Dept: GENERAL RADIOLOGY | Age: 27
End: 2017-10-03
Payer: COMMERCIAL

## 2017-10-03 VITALS
SYSTOLIC BLOOD PRESSURE: 132 MMHG | OXYGEN SATURATION: 97 % | RESPIRATION RATE: 18 BRPM | HEIGHT: 77 IN | BODY MASS INDEX: 19.48 KG/M2 | DIASTOLIC BLOOD PRESSURE: 79 MMHG | TEMPERATURE: 97.5 F | WEIGHT: 165 LBS | HEART RATE: 85 BPM

## 2017-10-03 DIAGNOSIS — R07.9 CHEST PAIN, UNSPECIFIED TYPE: Primary | ICD-10-CM

## 2017-10-03 LAB
ABSOLUTE EOS #: 0.7 K/UL (ref 0–0.4)
ABSOLUTE LYMPH #: 2.1 K/UL (ref 1–4.8)
ABSOLUTE MONO #: 0.7 K/UL (ref 0.1–1.2)
ANION GAP SERPL CALCULATED.3IONS-SCNC: 10 MMOL/L (ref 9–17)
BASOPHILS # BLD: 0 %
BASOPHILS ABSOLUTE: 0 K/UL (ref 0–0.2)
BUN BLDV-MCNC: 14 MG/DL (ref 6–20)
BUN/CREAT BLD: NORMAL (ref 9–20)
CALCIUM SERPL-MCNC: 9.2 MG/DL (ref 8.6–10.4)
CHLORIDE BLD-SCNC: 98 MMOL/L (ref 98–107)
CO2: 29 MMOL/L (ref 20–31)
CREAT SERPL-MCNC: 0.82 MG/DL (ref 0.7–1.2)
DIFFERENTIAL TYPE: ABNORMAL
EOSINOPHILS RELATIVE PERCENT: 9 %
GFR AFRICAN AMERICAN: >60 ML/MIN
GFR NON-AFRICAN AMERICAN: >60 ML/MIN
GFR SERPL CREATININE-BSD FRML MDRD: NORMAL ML/MIN/{1.73_M2}
GFR SERPL CREATININE-BSD FRML MDRD: NORMAL ML/MIN/{1.73_M2}
GLUCOSE BLD-MCNC: 96 MG/DL (ref 70–99)
HCT VFR BLD CALC: 36.2 % (ref 41–53)
HEMOGLOBIN: 12.5 G/DL (ref 13.5–17.5)
LYMPHOCYTES # BLD: 28 %
MCH RBC QN AUTO: 30.1 PG (ref 26–34)
MCHC RBC AUTO-ENTMCNC: 34.5 G/DL (ref 31–37)
MCV RBC AUTO: 87.2 FL (ref 80–100)
MONOCYTES # BLD: 9 %
PDW BLD-RTO: 15.8 % (ref 12.5–15.4)
PLATELET # BLD: 271 K/UL (ref 140–450)
PLATELET ESTIMATE: ABNORMAL
PMV BLD AUTO: 7.2 FL (ref 6–12)
POC TROPONIN I: 0 NG/ML (ref 0–0.1)
POC TROPONIN I: 0 NG/ML (ref 0–0.1)
POC TROPONIN INTERP: NORMAL
POC TROPONIN INTERP: NORMAL
POTASSIUM SERPL-SCNC: 4 MMOL/L (ref 3.7–5.3)
RBC # BLD: 4.15 M/UL (ref 4.5–5.9)
RBC # BLD: ABNORMAL 10*6/UL
SEG NEUTROPHILS: 54 %
SEGMENTED NEUTROPHILS ABSOLUTE COUNT: 4 K/UL (ref 1.8–7.7)
SODIUM BLD-SCNC: 137 MMOL/L (ref 135–144)
WBC # BLD: 7.6 K/UL (ref 3.5–11)
WBC # BLD: ABNORMAL 10*3/UL

## 2017-10-03 PROCEDURE — 71020 XR CHEST STANDARD TWO VW: CPT

## 2017-10-03 PROCEDURE — G0384 LEV 5 HOSP TYPE B ED VISIT: HCPCS

## 2017-10-03 PROCEDURE — 84484 ASSAY OF TROPONIN QUANT: CPT

## 2017-10-03 PROCEDURE — 85025 COMPLETE CBC W/AUTO DIFF WBC: CPT

## 2017-10-03 PROCEDURE — 80048 BASIC METABOLIC PNL TOTAL CA: CPT

## 2017-10-03 PROCEDURE — 6360000002 HC RX W HCPCS: Performed by: STUDENT IN AN ORGANIZED HEALTH CARE EDUCATION/TRAINING PROGRAM

## 2017-10-03 PROCEDURE — 2580000003 HC RX 258: Performed by: STUDENT IN AN ORGANIZED HEALTH CARE EDUCATION/TRAINING PROGRAM

## 2017-10-03 PROCEDURE — 96374 THER/PROPH/DIAG INJ IV PUSH: CPT

## 2017-10-03 PROCEDURE — 93005 ELECTROCARDIOGRAM TRACING: CPT

## 2017-10-03 RX ORDER — ONDANSETRON 2 MG/ML
4 INJECTION INTRAMUSCULAR; INTRAVENOUS ONCE
Status: COMPLETED | OUTPATIENT
Start: 2017-10-03 | End: 2017-10-03

## 2017-10-03 RX ORDER — 0.9 % SODIUM CHLORIDE 0.9 %
500 INTRAVENOUS SOLUTION INTRAVENOUS ONCE
Status: COMPLETED | OUTPATIENT
Start: 2017-10-03 | End: 2017-10-03

## 2017-10-03 RX ADMIN — ONDANSETRON 4 MG: 2 INJECTION, SOLUTION INTRAMUSCULAR; INTRAVENOUS at 22:25

## 2017-10-03 RX ADMIN — SODIUM CHLORIDE 500 ML: 9 INJECTION, SOLUTION INTRAVENOUS at 21:00

## 2017-10-03 NOTE — ED AVS SNAPSHOT
After Visit Summary  (Discharge Instructions)    Medication List for Home    Based on the information you provided to us as well as any changes during this visit, the following is your updated medication list.  Compare this with your prescription bottles at home. If you have any questions or concerns, contact your primary care physician's office. Daily Medication List (This medication list can be shared with any Healthcare provider who is helping you manage your medications)      ASK your doctor about these medications if you have questions     benztropine 1 MG tablet   Commonly known as:  COGENTIN   Take 1 tablet by mouth 2 times daily       ibuprofen 800 MG tablet   Commonly known as:  ADVIL;MOTRIN   Take 1 tablet by mouth every 8 hours as needed for Pain       nicotine polacrilex 2 MG gum   Commonly known as:  NICORETTE   Take 1 each by mouth as needed for Smoking cessation       paliperidone 9 MG extended release tablet   Commonly known as:  INVEGA   Take 1 tablet by mouth daily       paliperidone palmitate 156 MG/ML Susp IM injection   Commonly known as:  INVEGA SUSTENNA   Inject 156 mg into the muscle every 30 days       traZODone 150 MG tablet   Commonly known as:  DESYREL   Take 1 tablet by mouth nightly as needed for Sleep               Allergies as of 10/3/2017     No Known Allergies      Immunizations as of 10/3/2017     No immunizations on file. After Visit Summary    This summary was created for you. Thank you for entrusting your care to us.   The following information includes details about your hospital/visit stay along with steps you should take to help with your recovery once you leave the hospital.  In this packet, you will find information about the topics listed below:    · Instructions about your medications including a list of your home medications  · A summary of your hospital visit  · Follow-up appointments once you have left the hospital medical emergencies, dial 911. For questions regarding your [employer] account call [phone]. If you have a clinical question, please call your doctor's office. View your information online  ? Review your current list of  medications, immunization, and allergies. ? Review your future test results online . ? Review your discharge instructions provided by your caregivers at discharge    Certain functionality such as prescription refills, scheduling appointments or sending messages to your provider are not activated if your provider does not use [employer] in his/her office    For questions regarding your [employer] account call [phone]. If you have a clinical question, please call your doctor's office. The information on all pages of the After Visit Summary has been reviewed with me, the patient and/or responsible adult, by my health care provider(s). I had the opportunity to ask questions regarding this information. I understand I should dispose of my armband safely at home to protect my health information. A complete copy of the After Visit Summary has been given to me, the patient and/or responsible adult. Patient Signature/Responsible Adult: ___________________________________    Nurse Signature: ___________________________________  Resident/MLP Signature: ___________________________________  Attending Signature: ___________________________________    Date:____________Time:____________              Discharge Instructions       Call today or tomorrow to follow up with primary care provider  in 3-5 days or with the cardiologist as scheduled. Please take all medications as prescribed. You can also use ibuprofen or Tylenol (unless prescribed medications that have Tylenol in it) for pain.   You can take over the counter Ibuprofen (advil) tablets (4 every 8 hours or 3 every 6 hours or 2 every 4 hours)      Please return to the Emergency Department if you develop any symptoms that concern you, including the following: increasing pain, shortness of breath, swelling to your feet, unable to lay flat, vomiting, fevers, numbness, weakness, loss of bladder/bowel control, loss of consciousness or any other concerns. Chest Pain: Care Instructions  Your Care Instructions  There are many things that can cause chest pain. Some are not serious and will get better on their own in a few days. But some kinds of chest pain need more testing and treatment. Your doctor may have recommended a follow-up visit in the next 8 to 12 hours. If you are not getting better, you may need more tests or treatment. Even though your doctor has released you, you still need to watch for any problems. The doctor carefully checked you, but sometimes problems can develop later. If you have new symptoms or if your symptoms do not get better, get medical care right away. If you have worse or different chest pain or pressure that lasts more than 5 minutes or you passed out (lost consciousness), call 911 or seek other emergency help right away. A medical visit is only one step in your treatment. Even if you feel better, you still need to do what your doctor recommends, such as going to all suggested follow-up appointments and taking medicines exactly as directed. This will help you recover and help prevent future problems. How can you care for yourself at home? · Rest until you feel better. · Take your medicine exactly as prescribed. Call your doctor if you think you are having a problem with your medicine. · Do not drive after taking a prescription pain medicine. When should you call for help? Call 911 if:  · You passed out (lost consciousness). · You have severe difficulty breathing. · You have symptoms of a heart attack. These may include:  ¨ Chest pain or pressure, or a strange feeling in your chest.  ¨ Sweating. ¨ Shortness of breath. ¨ Nausea or vomiting.

## 2017-10-04 ASSESSMENT — ENCOUNTER SYMPTOMS
SORE THROAT: 0
RHINORRHEA: 0
COUGH: 0
VOMITING: 0
WHEEZING: 0
NAUSEA: 0
BLOOD IN STOOL: 0
ABDOMINAL PAIN: 0
BACK PAIN: 0
PHOTOPHOBIA: 0
SHORTNESS OF BREATH: 0
ABDOMINAL DISTENTION: 0

## 2017-10-04 NOTE — ED TRIAGE NOTES
Pt arrived to ED reporting he experienced mid sternal and left sided chest pain with lightheadedness an hour pta while walking 2-3 miles. Pt reports symptoms have resolved. Pt denies any pain or shortness of breath. EKG completed, IV initiated and patient placed on cardiac monitor.

## 2017-10-04 NOTE — ED PROVIDER NOTES
[facility]  ED  Emergency Department Encounter  Emergency Medicine Resident     Pt Name: Gal Salas  MRN: [id number]  [place] 1990  Date of evaluation: 10/3/17  PCP:  No primary care provider on file. CHIEF COMPLAINT       Chief Complaint   Patient presents with    Chest Pain     pt states he was walking 2-3 miles when he got lightheaded and experienced some chest pain that have now resolved    Dizziness       HISTORY OF PRESENT ILLNESS  (Location/Symptom, Timing/Onset, Context/Setting, Quality, Duration, Modifying Factors, Severity.)      Gal Salas is a 32 y.o. male who presents with Chest pain and lightheadedness. Patient states he was walking about 2-3 miles across town when he felt a sharp pain in his chest and felt lightheaded. Patient states that the pain was constant and unchanging. He did not fall or hit his head. He states his daughter had this pain before. He does not have any significant cardiac history. He states he does smoke cigarettes about a half a pack per day, but he denies any cocaine or other drug or alcohol use. He is well-appearing and nontoxic. Patient states that he doesn't drink much water throughout the day and feels thirsty. PAST MEDICAL / SURGICAL / SOCIAL / FAMILY HISTORY      has a past medical history of Depression and Schizophrenia ([facility] [address] has no past surgical history on file. Social History     Social History    Marital status: Single     Spouse name: N/A    Number of children: N/A    Years of education: N/A     Occupational History    Not on file. Social History Main Topics    Smoking status: Current Every Day Smoker     Packs/day: 1.00    Smokeless tobacco: Never Used      Comment: pt is accepting of nicotine replacement     Alcohol use No      Comment: denies    Drug use: No      Comment: Denies drug use.     Sexual activity: Yes     Partners: Female     Other Topics Concern    Not on file     Social History Narrative       Family History   Problem Relation Age of Onset    Family history unknown: Yes       Allergies:  Review of patient's allergies indicates no known allergies. Home Medications:  Prior to Admission medications    Medication Sig Start Date End Date Taking? Authorizing Provider   traZODone (DESYREL) 150 MG tablet Take 1 tablet by mouth nightly as needed for Sleep 9/18/17   Jam Monroy MD   benztropine (COGENTIN) 1 MG tablet Take 1 tablet by mouth 2 times daily 9/18/17   Jam Monroy MD   paliperidone (INVEGA) 9 MG extended release tablet Take 1 tablet by mouth daily 9/18/17   Jam Monroy MD   nicotine polacrilex (NICORETTE) 2 MG gum Take 1 each by mouth as needed for Smoking cessation 9/18/17   Jam Monroy MD   paliperidone palmitate (INVEGA SUSTENNA) 156 MG/ML SUSP IM injection Inject 156 mg into the muscle every 30 days    Historical Provider, MD   ibuprofen (ADVIL;MOTRIN) 800 MG tablet Take 1 tablet by mouth every 8 hours as needed for Pain 8/10/17   Flor Saba MD       REVIEW OF SYSTEMS    (2-9 systems for level 4, 10 or more for level 5)      Review of Systems   Constitutional: Negative for appetite change, chills, diaphoresis, fatigue, fever and unexpected weight change. HENT: Negative for congestion, rhinorrhea and sore throat. Eyes: Negative for photophobia. Respiratory: Negative for cough, shortness of breath and wheezing. Cardiovascular: Positive for chest pain. Negative for palpitations and leg swelling. Gastrointestinal: Negative for abdominal distention, abdominal pain, blood in stool, nausea and vomiting. Genitourinary: Negative for difficulty urinating, dysuria, flank pain and hematuria. Musculoskeletal: Negative for arthralgias, back pain and myalgias. Neurological: Positive for light-headedness. Negative for dizziness, syncope, weakness, numbness and headaches. Psychiatric/Behavioral: Negative for confusion.        PHYSICAL EXAM   (up to 7 for level 4,

## 2017-10-06 LAB
EKG ATRIAL RATE: 76 BPM
EKG P AXIS: 75 DEGREES
EKG P-R INTERVAL: 192 MS
EKG Q-T INTERVAL: 374 MS
EKG QRS DURATION: 112 MS
EKG QTC CALCULATION (BAZETT): 420 MS
EKG R AXIS: 25 DEGREES
EKG T AXIS: 41 DEGREES
EKG VENTRICULAR RATE: 76 BPM

## 2017-10-09 ENCOUNTER — HOSPITAL ENCOUNTER (EMERGENCY)
Age: 27
Discharge: HOME | End: 2017-10-09
Attending: EMERGENCY MEDICINE
Payer: COMMERCIAL

## 2017-10-09 VITALS
HEART RATE: 93 BPM | HEIGHT: 77 IN | DIASTOLIC BLOOD PRESSURE: 79 MMHG | WEIGHT: 165 LBS | TEMPERATURE: 97.9 F | OXYGEN SATURATION: 98 % | RESPIRATION RATE: 17 BRPM | BODY MASS INDEX: 19.48 KG/M2 | SYSTOLIC BLOOD PRESSURE: 127 MMHG

## 2017-10-09 DIAGNOSIS — B86 SCABIES: Primary | ICD-10-CM

## 2017-10-09 PROCEDURE — 99282 EMERGENCY DEPT VISIT SF MDM: CPT

## 2017-10-09 PROCEDURE — 6370000000 HC RX 637 (ALT 250 FOR IP): Performed by: EMERGENCY MEDICINE

## 2017-10-09 RX ORDER — DIPHENHYDRAMINE HCL 25 MG
25 TABLET ORAL ONCE
Status: COMPLETED | OUTPATIENT
Start: 2017-10-09 | End: 2017-10-09

## 2017-10-09 RX ORDER — PERMETHRIN 50 MG/G
CREAM TOPICAL ONCE
Status: COMPLETED | OUTPATIENT
Start: 2017-10-09 | End: 2017-10-09

## 2017-10-09 RX ADMIN — PERMETHRIN: 50 CREAM TOPICAL at 04:52

## 2017-10-09 RX ADMIN — DIPHENHYDRAMINE HCL 25 MG: 25 TABLET ORAL at 04:53

## 2017-10-09 ASSESSMENT — ENCOUNTER SYMPTOMS
SORE THROAT: 0
NAUSEA: 0
ABDOMINAL PAIN: 0
COUGH: 0
SHORTNESS OF BREATH: 0
VOMITING: 0

## 2017-10-09 NOTE — ED PROVIDER NOTES
known allergies. Home Medications:  Prior to Admission medications    Medication Sig Start Date End Date Taking? Authorizing Provider   traZODone (DESYREL) 150 MG tablet Take 1 tablet by mouth nightly as needed for Sleep 9/18/17   Ivis Fajardo MD   benztropine (COGENTIN) 1 MG tablet Take 1 tablet by mouth 2 times daily 9/18/17   Ivis Fajardo MD   paliperidone (INVEGA) 9 MG extended release tablet Take 1 tablet by mouth daily 9/18/17   Ivis Fajardo MD   nicotine polacrilex (NICORETTE) 2 MG gum Take 1 each by mouth as needed for Smoking cessation 9/18/17   Ivis Fajardo MD   paliperidone palmitate (INVEGA SUSTENNA) 156 MG/ML SUSP IM injection Inject 156 mg into the muscle every 30 days    Historical Provider, MD   ibuprofen (ADVIL;MOTRIN) 800 MG tablet Take 1 tablet by mouth every 8 hours as needed for Pain 8/10/17   Alexandrea Patel MD       REVIEW OF SYSTEMS    (2-9 systems for level 4, 10 or more for level 5)      Review of Systems   Constitutional: Negative for chills and fever. HENT: Negative for sore throat. Eyes: Negative for visual disturbance. Respiratory: Negative for cough and shortness of breath. Cardiovascular: Negative for chest pain. Gastrointestinal: Negative for abdominal pain, nausea and vomiting. Genitourinary: Negative for difficulty urinating. Musculoskeletal: Negative for arthralgias and myalgias. Skin: Positive for rash. Negative for wound. Neurological: Negative for weakness, numbness and headaches. Psychiatric/Behavioral: Negative for behavioral problems. PHYSICAL EXAM   (up to 7 for level 4, 8 or more for level 5)      INITIAL VITALS:   /79   Pulse 93   Temp 97.9 °F (36.6 °C) (Oral)   Resp 17   Ht 6' 5\" (1.956 m)   Wt 165 lb (74.8 kg)   SpO2 98%   BMI 19.57 kg/m²     Physical Exam   Constitutional: He is oriented to person, place, and time. He appears well-developed and well-nourished. No distress. HENT:   Head: Normocephalic and atraumatic. Mouth/Throat: Oropharynx is clear and moist.   Eyes: EOM are normal. Pupils are equal, round, and reactive to light. Neck: Normal range of motion. Cardiovascular: Normal rate and regular rhythm. Pulmonary/Chest: Effort normal. He has no wheezes. He has no rales. Abdominal: Soft. There is no tenderness. There is no rebound and no guarding. Musculoskeletal: Normal range of motion. Neurological: He is alert and oriented to person, place, and time. He has normal strength. GCS eye subscore is 4. GCS verbal subscore is 5. GCS motor subscore is 6. Skin:   Diffuse erythematous papules with secondary excoriation located across his chest, arms, back, abdomen and bilateral lower extremities   Psychiatric: His behavior is normal.       DIFFERENTIAL  DIAGNOSIS     PLAN (LABS / IMAGING / EKG):  No orders of the defined types were placed in this encounter. MEDICATIONS ORDERED:  Orders Placed This Encounter   Medications    permethrin (ELIMITE) 5 % cream    diphenhydrAMINE (BENADRYL) tablet 25 mg       DIAGNOSTIC RESULTS / EMERGENCY DEPARTMENT COURSE / MDM     LABS:  No results found for this visit on 10/09/17. IMPRESSION: Based on presentation and history, suspicion for scabies is high. Patient is afebrile and hemodynamically stable. He is healthy and nontoxic in appearance. Does look uncomfortable with the rash. We'll plan to administer permethrin and discharge him. RADIOLOGY:  None    EKG  None    All EKG's are interpreted by the Emergency Department Physician who either signs or Co-signs this chart in the absence of a cardiologist.    EMERGENCY DEPARTMENT COURSE:  Patient was advised to apply permethrin over his body at night prior to going to sleep. Was advised to shower the following morning. Discussed that if symptoms did not improve, that he should try the same thing one week later. Patient demonstrated his understanding and is agreeable with plan.   Patient is stable for

## 2017-10-09 NOTE — ED PROVIDER NOTES
[facility]  ED     Emergency Department     Faculty Attestation    I performed a history and physical examination of the patient and discussed management with the resident. I reviewed the residents note and agree with the documented findings and plan of care. Any areas of disagreement are noted on the chart. I was personally present for the key portions of any procedures. I have documented in the chart those procedures where I was not present during the key portions. I have reviewed the emergency nurses triage note. I agree with the chief complaint, past medical history, past surgical history, allergies, medications, social and family history as documented unless otherwise noted below. For Physician Assistant/ Nurse Practitioner cases/documentation I have personally evaluated this patient and have completed at least one if not all key elements of the E/M (history, physical exam, and MDM). Additional findings are as noted. Patient presents with a rash to his upper extremities that he has had for about one week. He says it is very itchy. He denies fever, chills, nausea or vomiting. On exam, patient is sitting on the bed and appears well. There is a erythematous papular rash to the patient's upper extremities and between the webs of his fingers. This does appear to be consistent with scabies. We'll treat with permethrin and Benadryl.       Gal Emanuel MD  Attending Emergency  Physician              Michi Prince MD  10/09/17 [address] Kevin Shepard MD  10/09/17 [id number]

## 2017-10-11 ENCOUNTER — HOSPITAL ENCOUNTER (EMERGENCY)
Age: 27
Discharge: HOME | End: 2017-10-11
Attending: EMERGENCY MEDICINE
Payer: COMMERCIAL

## 2017-10-11 VITALS
SYSTOLIC BLOOD PRESSURE: 138 MMHG | HEART RATE: 85 BPM | DIASTOLIC BLOOD PRESSURE: 98 MMHG | HEIGHT: 77 IN | OXYGEN SATURATION: 99 % | BODY MASS INDEX: 20.66 KG/M2 | RESPIRATION RATE: 15 BRPM | TEMPERATURE: 97.2 F | WEIGHT: 175 LBS

## 2017-10-11 DIAGNOSIS — B86 SCABIES: ICD-10-CM

## 2017-10-11 DIAGNOSIS — R21 RASH AND OTHER NONSPECIFIC SKIN ERUPTION: Primary | ICD-10-CM

## 2017-10-11 PROCEDURE — 6370000000 HC RX 637 (ALT 250 FOR IP): Performed by: STUDENT IN AN ORGANIZED HEALTH CARE EDUCATION/TRAINING PROGRAM

## 2017-10-11 PROCEDURE — 6370000000 HC RX 637 (ALT 250 FOR IP): Performed by: EMERGENCY MEDICINE

## 2017-10-11 PROCEDURE — 99282 EMERGENCY DEPT VISIT SF MDM: CPT

## 2017-10-11 RX ORDER — DIPHENHYDRAMINE HCL 25 MG
25 CAPSULE ORAL EVERY 6 HOURS PRN
Qty: 12 CAPSULE | Refills: 0 | Status: ON HOLD | OUTPATIENT
Start: 2017-10-11 | End: 2020-05-26

## 2017-10-11 RX ORDER — IVERMECTIN 3 MG/1
16 TABLET ORAL ONCE
Status: COMPLETED | OUTPATIENT
Start: 2017-10-11 | End: 2017-10-11

## 2017-10-11 RX ORDER — DIPHENHYDRAMINE HCL 25 MG
25 TABLET ORAL ONCE
Status: COMPLETED | OUTPATIENT
Start: 2017-10-11 | End: 2017-10-11

## 2017-10-11 RX ADMIN — DIPHENHYDRAMINE HCL 25 MG: 25 TABLET ORAL at 03:36

## 2017-10-11 RX ADMIN — IVERMECTIN 16.5 MG: 3 TABLET ORAL at 03:36

## 2017-10-11 ASSESSMENT — ENCOUNTER SYMPTOMS
SHORTNESS OF BREATH: 0
COUGH: 0
ABDOMINAL PAIN: 0
BACK PAIN: 0

## 2017-10-11 NOTE — ED PROVIDER NOTES
[facility]  ED  Emergency Department Encounter  Emergency Medicine Resident     Pt Name: Honey Mckeon  MRN: [id number]  [place] 1990  Date of evaluation: 10/11/17  PCP:  No primary care provider on file. CHIEF COMPLAINT       Chief Complaint   Patient presents with    Rash     pt stated he is living at a group home and has been getting bitten by bed bugs. pt did not fill his prescription from the other day when he was here       HISTORY OF PRESENT ILLNESS  (Location/Symptom, Timing/Onset, Context/Setting, Quality, Duration, Modifying Factors, Severity.)      Honey Mckeon is a 32 y.o. male who presents with Rash. Patient reports he lives at a group home where he believes there is a bedbug infestation. Patient reports he was seen here 2 days ago and was told he scabies, at which time he was given a tube of cream, which she was supposed to apply, but patient reports he never did this, as he lost the tube. Patient reports he is quite itchy, and cannot stop scratching the rash. He denies fevers, chills, new lesions, pus from any of the lesions. PAST MEDICAL / SURGICAL / SOCIAL / FAMILY HISTORY      has a past medical history of Depression and Schizophrenia ([facility] [address] has no past surgical history on file. Social History     Social History    Marital status: Single     Spouse name: N/A    Number of children: N/A    Years of education: N/A     Occupational History    Not on file. Social History Main Topics    Smoking status: Current Every Day Smoker     Packs/day: 1.00    Smokeless tobacco: Never Used      Comment: pt is accepting of nicotine replacement     Alcohol use No      Comment: denies    Drug use: No      Comment: Denies drug use.  Sexual activity: Yes     Partners: Female     Other Topics Concern    Not on file     Social History Narrative    No narrative on file       Family History   Problem Relation Age of Onset    Family history unknown:  Yes Allergies:  Review of patient's allergies indicates no known allergies. Home Medications:  Prior to Admission medications    Medication Sig Start Date End Date Taking? Authorizing Provider   traZODone (DESYREL) 150 MG tablet Take 1 tablet by mouth nightly as needed for Sleep 9/18/17   Krystyna Aly, MD   benztropine (COGENTIN) 1 MG tablet Take 1 tablet by mouth 2 times daily 9/18/17   Krystyna Aly, MD   paliperidone (INVEGA) 9 MG extended release tablet Take 1 tablet by mouth daily 9/18/17   Krystyna Aly, MD   nicotine polacrilex (NICORETTE) 2 MG gum Take 1 each by mouth as needed for Smoking cessation 9/18/17   Krystyna Aly, MD   paliperidone palmitate (INVEGA SUSTENNA) 156 MG/ML SUSP IM injection Inject 156 mg into the muscle every 30 days    Historical Provider, MD   ibuprofen (ADVIL;MOTRIN) 800 MG tablet Take 1 tablet by mouth every 8 hours as needed for Pain 8/10/17   Eric Prado MD       REVIEW OF SYSTEMS    (2-9 systems for level 4, 10 or more for level 5)      Review of Systems   Constitutional: Negative for activity change, fatigue and fever. HENT: Negative for sneezing. Eyes: Negative for visual disturbance. Respiratory: Negative for cough and shortness of breath. Cardiovascular: Negative for leg swelling. Gastrointestinal: Negative for abdominal pain. Endocrine: Negative for polyuria. Genitourinary: Negative for decreased urine volume. Musculoskeletal: Negative for back pain and myalgias. Skin: Positive for rash. Neurological: Negative for dizziness. PHYSICAL EXAM   (up to 7 for level 4, 8 or more for level 5)      INITIAL VITALS:   BP (!) 138/98   Pulse 85   Temp 97.2 °F (36.2 °C) (Oral)   Resp 15   Ht 6' 5\" (1.956 m)   Wt 175 lb (79.4 kg)   SpO2 99%   BMI 20.75 kg/m²     Physical Exam   Constitutional: He is oriented to person, place, and time. He appears well-developed and well-nourished. HENT:   Head: Normocephalic and atraumatic.    Eyes: Pupils are importance of washing sheets and area around as much as able, however acknowledge that this may be difficult as patient is currently living in a shelter. Advised patient to avoid itching as this could cause a secondary infection, and advised patient to return if any fevers, chills, pus worsening of the rash occurred. ED Course      PROCEDURES:  None    CONSULTS:  None    CRITICAL CARE:  None    FINAL IMPRESSION      No diagnosis found. DISPOSITION / PLAN     DISPOSITION     PATIENT REFERRED TO:  No follow-up provider specified.     DISCHARGE MEDICATIONS:  New Prescriptions    No medications on file       Skip Molina MD  Emergency Medicine Resident    (Please note that portions of this note were completed with a voice recognition program.  Efforts were made to edit the dictations but occasionally words are mis-transcribed.)       Skip Molina MD  10/11/17 7022

## 2017-10-11 NOTE — ED PROVIDER NOTES
[facility]  Emergency Department  Faculty Attestation     I performed a history and physical examination of the patient and discussed management with the resident. I reviewed the residents note and agree with the documented findings and plan of care. Any areas of disagreement are noted on the chart. I was personally present for the key portions of any procedures. I have documented in the chart those procedures where I was not present during the key portions. I have reviewed the emergency nurses triage note. I agree with the chief complaint, past medical history, past surgical history, allergies, medications, social and family history as documented unless otherwise noted below. For Physician Assistant/ Nurse Practitioner cases/documentation I have personally evaluated this patient and have completed at least one if not all key elements of the E/M (history, physical exam, and MDM). Additional findings are as noted. Primary Care Physician:  No primary care provider on file. Screenings:  [unfilled]    CHIEF COMPLAINT       Chief Complaint   Patient presents with    Rash     pt stated he is living at a group home and has been getting bitten by bed bugs. pt did not fill his prescription from the other day when he was here       RECENT VITALS:   Temp: 97.2 °F (36.2 °C),  Pulse: 85, Resp: 15, BP: (!) 138/98    LABS:  Labs Reviewed - No data to display    Radiology  No orders to display       Attending Physician Additional  Notes    Patient has itchy red bumps all over the body. He was unable to fill his Elimite prescription for scabies. He has no medications for itching. On exam he is nontoxic afebrile. There is multiple erythematous papules but no burrows. There is no involvement of the digits. Impression is scabies versus bedbug bites. Plan is Benadryl, Elimite, follow-up. Felix Mcgarry.  Andrez Rubinstein, MD, [facility]  Attending Emergency  Physician

## 2017-10-28 ENCOUNTER — HOSPITAL ENCOUNTER (EMERGENCY)
Age: 27
Discharge: LEFT BEFORE DISCHARGE | End: 2017-10-28
Attending: EMERGENCY MEDICINE
Payer: COMMERCIAL

## 2017-10-28 ENCOUNTER — APPOINTMENT (OUTPATIENT)
Dept: ULTRASOUND IMAGING | Age: 27
End: 2017-10-28
Payer: COMMERCIAL

## 2017-10-28 VITALS
OXYGEN SATURATION: 98 % | TEMPERATURE: 97.9 F | HEIGHT: 77 IN | HEART RATE: 90 BPM | RESPIRATION RATE: 12 BRPM | DIASTOLIC BLOOD PRESSURE: 91 MMHG | WEIGHT: 165 LBS | BODY MASS INDEX: 19.48 KG/M2 | SYSTOLIC BLOOD PRESSURE: 145 MMHG

## 2017-10-28 DIAGNOSIS — N43.3 HYDROCELE, UNSPECIFIED HYDROCELE TYPE: Primary | ICD-10-CM

## 2017-10-28 LAB
BILIRUBIN URINE: NEGATIVE
COLOR: YELLOW
COMMENT UA: NORMAL
DIRECT EXAM: NORMAL
DIRECT EXAM: NORMAL
GLUCOSE URINE: NEGATIVE
KETONES, URINE: NEGATIVE
LEUKOCYTE ESTERASE, URINE: NEGATIVE
Lab: NORMAL
NITRITE, URINE: NEGATIVE
PH UA: 5.5 (ref 5–8)
PROTEIN UA: NEGATIVE
SPECIFIC GRAVITY UA: 1.01 (ref 1–1.03)
SPECIMEN DESCRIPTION: NORMAL
STATUS: NORMAL
TURBIDITY: CLEAR
URINE HGB: NEGATIVE
UROBILINOGEN, URINE: NORMAL

## 2017-10-28 PROCEDURE — 87210 SMEAR WET MOUNT SALINE/INK: CPT

## 2017-10-28 PROCEDURE — 81003 URINALYSIS AUTO W/O SCOPE: CPT

## 2017-10-28 PROCEDURE — 87591 N.GONORRHOEAE DNA AMP PROB: CPT

## 2017-10-28 PROCEDURE — 76870 US EXAM SCROTUM: CPT

## 2017-10-28 PROCEDURE — 99284 EMERGENCY DEPT VISIT MOD MDM: CPT

## 2017-10-28 PROCEDURE — 87491 CHLMYD TRACH DNA AMP PROBE: CPT

## 2017-10-28 PROCEDURE — 93976 VASCULAR STUDY: CPT

## 2017-10-28 ASSESSMENT — ENCOUNTER SYMPTOMS
VOMITING: 0
DIARRHEA: 0
NAUSEA: 0
ABDOMINAL PAIN: 0

## 2017-10-28 ASSESSMENT — PAIN DESCRIPTION - PAIN TYPE: TYPE: ACUTE PAIN

## 2017-10-28 ASSESSMENT — PAIN DESCRIPTION - LOCATION: LOCATION: SCROTUM;PENIS

## 2017-10-28 ASSESSMENT — PAIN SCALES - GENERAL: PAINLEVEL_OUTOF10: 10
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
## 2017-10-28 NOTE — ED PROVIDER NOTES
[facility] [address] [facility]     Emergency Department     Faculty Attestation        I performed a history and physical examination of the patient and discussed management with the resident. I reviewed the residents note and agree with the documented findings and plan of care. Any areas of disagreement are noted on the chart. I was personally present for the key portions of any procedures. I have documented in the chart those procedures where I was not present during the key portions. I have reviewed the emergency nurses triage note. I agree with the chief complaint, past medical history, past surgical history, allergies, medications, social and family history as documented unless otherwise noted below. For mid-level providers such as nurse practitioners as well as physicians assistants:    I have personally seen and evaluated the patient. I find the patient's history and physical exam are consistent with NP/PA documentation. I agree with the care provided, treatment rendered, disposition, & follow-up plan. Additional findings are as noted. Vital Signs: BP (!) 145/91   Pulse 90   Temp 97.9 °F (36.6 °C) (Oral)   Resp 12   Ht 6' 5\" (1.956 m)   Wt 165 lb (74.8 kg)   SpO2 98%   BMI 19.57 kg/m²   PCP:  No primary care provider on file. Pertinent Comments:           Critical Care  None         Note, if the patient's blood pressure was elevated, and they have no history of hypertension, they were informed of the following: The patient may have Pre-hypertension/Hypertension: The patient has been informed that they may have pre-hypertension or Hypertension based on a blood pressure reading in the emergency department. I recommend that the patient call the primary care provider listed on their discharge instructions or a physician of their choice this week to arrange follow up for further evaluation of possible pre-hypertension or Hypertension.   (Please note that portions of this note were completed with a voice recognition program.  Efforts were made to edit the dictations but occasionally words are mis-transcribed. )    Aline Zepeda MD  Attending Emergency Medicine Physician              Zana Davila MD  10/28/17 8179

## 2017-10-29 NOTE — ED NOTES
Pt resting on cot with eyes closed at this time. RR even and unlabored. Awaiting POC.       Sharee Bojorquez RN  10/28/17 3535

## 2017-10-30 LAB
C. TRACHOMATIS DNA ,URINE: NEGATIVE
N. GONORRHOEAE DNA, URINE: NEGATIVE

## 2020-05-03 ENCOUNTER — HOSPITAL ENCOUNTER (EMERGENCY)
Age: 30
Discharge: HOME | End: 2020-05-03
Attending: EMERGENCY MEDICINE
Payer: COMMERCIAL

## 2020-05-03 VITALS
SYSTOLIC BLOOD PRESSURE: 116 MMHG | OXYGEN SATURATION: 100 % | DIASTOLIC BLOOD PRESSURE: 78 MMHG | RESPIRATION RATE: 14 BRPM | HEIGHT: 77 IN | TEMPERATURE: 99 F | BODY MASS INDEX: 19.48 KG/M2 | HEART RATE: 65 BPM | WEIGHT: 165 LBS

## 2020-05-03 PROCEDURE — 99283 EMERGENCY DEPT VISIT LOW MDM: CPT

## 2020-05-03 ASSESSMENT — ENCOUNTER SYMPTOMS
COUGH: 0
BACK PAIN: 0
ABDOMINAL PAIN: 0
SHORTNESS OF BREATH: 0
VOMITING: 0
DIARRHEA: 0

## 2020-05-03 NOTE — ED PROVIDER NOTES
movement of the palate b/l  sternocleidomastoid and trapezius muscle strength symmetric and strong b/l  symmetric tongue movement b/l  Motor: strength testing 5/5 in b/l biceps, triceps, hand interossei, iliopsoas, hamstrings, ankle plantar and dorsiflexors. Coordination: occasional involuntary head jerks/movements noted  Reflexes: b/l brachioradialis and patellar 2+/4 and equal b/l  Gait: steady, equal gait  Sensation: light touch intact to all four distal limbs     Psychiatric:         Attention and Perception: He does not perceive auditory or visual hallucinations. Behavior: Behavior is cooperative. Thought Content: Thought content does not include homicidal or suicidal ideation. WORK-UP     PLAN (LABS / IMAGING /EKG):  No orders of the defined types were placed in this encounter. MEDICATIONS ORDERED:  No orders of the defined types were placed in this encounter. DIAGNOSTIC RESULTS / EMERGENCY DEPARTMENT COURSE /MDM / DIFFERENTIAL DIAGNOSIS     LABS:  No results found for this visit on 05/03/20. RADIOLOGY:  None    EKG  None    All EKG's are interpreted by the Emergency Department Physician who either signs or Co-signs this chart in the absence of a cardiologist.    DIFFERENTIAL DIAGNOSIS:  Fatigue, viral syndrome, dehydration    EMERGENCY DEPARTMENT COURSE & MDM:  27 y.o. male presents with a chief complaint of generalized fatigue. Vitals signs stable. Patient is well-appearing, no acute distress. Clinically, patient has no signs of dehydration, and has not been losing fluid from vomiting or diarrhea. Full neuro exam completed without acute deficit. Patient has no signs of a viral syndrome. Patient is a history of bipolar disorder and denies any suicidal or homicidal ideation, no hallucinations. I have recommended that the patient establish care with a PCP and return to the ED for any care or complaint.  I explained that although the ED work-up performed today is reassuring, it is always possible for things to worsen or change while at home - as such, it is important to return to the ED immediately for re-evaluation if symptoms  worsen, or change, and/or if new symptoms develop. Additional verbal and written discharge instructions and return precautions were given. All questions were answered prior to discharge. 12:46 PM EDT patient left without discharge papers    PROCEDURES:  None    CONSULTS:  None    CRITICAL CARE:  Please see attending physician note. FINAL IMPRESSION      1. Fatigue, unspecified type          DISPOSITION / PLAN     DISPOSITION Decision To Discharge 05/03/2020 12:45:59 PM    PATIENT REFERRED TO:  [facility]   [address] [address]    [facility]: [phone]. Go to or schedule an appointment for primary care follow up.  Open 8a-8p M-F, 10a-4p Sa-Mena      DISCHARGE MEDICATIONS:  New Prescriptions    No medications on file       Brittney Gary DO  Emergency Medicine Resident    (Please note that portions ofthis note were completed with a voice recognition program.  Efforts were made to edit the dictations but occasionally words are mis-transcribed.)        Brittney Gary DO  Resident  05/03/20 3967

## 2020-05-03 NOTE — ED NOTES
Patient presents to ED for evaluation of fatigue. Patient reports fatigue since he was 22years old. Patient denies fever, chills, cough, chest pain, shortness of breath, lightheadedness, dizziness, nausea, vomiting, diarrhea, abdominal pain.      Fazal Jones RN  05/03/20 1604

## 2020-05-04 ENCOUNTER — CARE COORDINATION (OUTPATIENT)
Dept: CARE COORDINATION | Age: 30
End: 2020-05-04

## 2020-05-04 NOTE — CARE COORDINATION
Attempted to call patient for ED follow up/COVID outreach, line busy x 2 attempts. Will call again tomorrow.

## 2020-05-05 ENCOUNTER — CARE COORDINATION (OUTPATIENT)
Dept: CARE COORDINATION | Age: 30
End: 2020-05-05

## 2020-05-24 ENCOUNTER — HOSPITAL ENCOUNTER (INPATIENT)
Age: 30
LOS: 12 days | Discharge: HOME | DRG: 885 | End: 2020-06-05
Attending: PSYCHIATRY & NEUROLOGY | Admitting: PSYCHIATRY & NEUROLOGY
Payer: COMMERCIAL

## 2020-05-24 ENCOUNTER — HOSPITAL ENCOUNTER (EMERGENCY)
Age: 30
Discharge: PSYCHIATRIC OR SUBSTANCE USE FACILITY | End: 2020-05-24
Attending: EMERGENCY MEDICINE
Payer: COMMERCIAL

## 2020-05-24 VITALS
BODY MASS INDEX: 19.57 KG/M2 | HEART RATE: 79 BPM | RESPIRATION RATE: 16 BRPM | WEIGHT: 165 LBS | DIASTOLIC BLOOD PRESSURE: 82 MMHG | OXYGEN SATURATION: 98 % | TEMPERATURE: 98.2 F | SYSTOLIC BLOOD PRESSURE: 130 MMHG

## 2020-05-24 PROCEDURE — 1240000000 HC EMOTIONAL WELLNESS R&B

## 2020-05-24 PROCEDURE — 99285 EMERGENCY DEPT VISIT HI MDM: CPT

## 2020-05-24 ASSESSMENT — PAIN SCALES - GENERAL
PAINLEVEL_OUTOF10: 4
PAINLEVEL_OUTOF10: 0

## 2020-05-24 ASSESSMENT — ENCOUNTER SYMPTOMS
VOMITING: 0
NAUSEA: 0
DIARRHEA: 0
CONSTIPATION: 0
WHEEZING: 0
BACK PAIN: 0
COUGH: 0
ABDOMINAL PAIN: 0

## 2020-05-24 ASSESSMENT — SLEEP AND FATIGUE QUESTIONNAIRES
DO YOU USE A SLEEP AID: NO
DO YOU HAVE DIFFICULTY SLEEPING: NO
AVERAGE NUMBER OF SLEEP HOURS: 10

## 2020-05-24 ASSESSMENT — LIFESTYLE VARIABLES: HISTORY_ALCOHOL_USE: NO

## 2020-05-24 NOTE — ED NOTES
Provisional Diagnosis:    Schizoaffective Disorder    Psychosocial and Contextual Factors:     Unstable housing, poor community support, denies substance abuse    C-SSRS Summary:    Patient: X  Family:  Agency: [address]    Present Suicidal Behavior:    Verbal: Yes    Attempt: No    Past Suicidal Behavior:    Verbal: Yes    Attempt: Denies    Self-Injurious/Self-Mutilation: Pt denies      Protective Factors:    Linked with outpatient care, compliant with medications    Risk Factors:    Poor coping skills    Clinical Summary:    Pt is a 27year old male presenting to the ED with complaints of suicidal ideation with a plan to cut himself and command auditory hallucinations to harm himself. Pt denies homicidal ideation. Pt reports that he was living in his own apartment, but is now at [facility] and needs to go to a group home. Pt reports compliance with [address] and states he last received his Invega injection last month, but has not gotten it in May yet. Pt denies any admissions for inpatient psychiatric treatment since 2017, but does exhibit some thought blocking and responding to internal stimuli. Pt denies any drug or alcohol use or legal issues.       Level of Care Disposition:    Pending decision of on call psychiatrist       Keira Vilchis  05/24/20 1229

## 2020-05-24 NOTE — ED PROVIDER NOTES
and MDM. For Phys Assistant/ Nurse Practitioner cases/documentation I have had a face to face evaluation of this patient and have completed at least one if not all key elements of the E/M (history, physical exam, and MDM). Additional findings are as noted. For APC cases I have personally evaluated and examined the patient in conjunction with the APC and agree with the treatment plan and disposition of the patient as recorded by the APC.     Ana Wilkins MD  Attending Emergency  Physician       Queen Andi MD  05/24/20 4831

## 2020-05-24 NOTE — ED NOTES
Pt resting on cot respirations even and unlabored, pt denies needs at this time, waiting for bed placement at [facility] facility     [address] [place] [address], RN  05/24/20 5270

## 2020-05-24 NOTE — ED NOTES
Meal tray ordered, pt resting on cot respirations even and unlabored, pt denies needs at this time     Susana Staton, RN  05/24/20 7090

## 2020-05-24 NOTE — ED PROVIDER NOTES
[address] [facility]  ED  Emergency Department Encounter  EmergencyMedicine Resident     Pt Cathy Wagner  MRN: [id number]  [place] 1990  Date of evaluation: 5/24/20  PCP:  No primary care provider on file. CHIEF COMPLAINT       Chief Complaint   Patient presents with    Suicidal     Pt to ED with c/o feeling suicidal, pt states he is schizophrenic and is hearing voices telling him to hurt himself, pt has a plan to cut his fingers off, denies homicidal       HISTORY OF PRESENT ILLNESS  (Location/Symptom, Timing/Onset, Context/Setting, Quality, Duration, Modifying Factors, Severity.)    This patient was evaluated in the Emergency Department for symptoms described in the history of present illness. He/she was evaluated in the context of the global COVID-19 pandemic, which necessitated consideration that the patient might be at risk for infection with the SARS-CoV-2 virus that causes COVID-19. Institutional protocols and algorithms that pertain to the evaluation of patients at risk for COVID-19 are in a state of rapid change based on information released by regulatory bodies including the CDC and federal and state organizations. These policies and algorithms were followed during the patient's care in the ED. James Abdi is a 27 y.o. male who presents to the emergency department with suicidal and homicidal ideations. History of schizophrenia and depression. States that symptoms have been worse for the past 1 to 2 months. Reports command hallucinations telling him to cut himself or cut off 1 of his fingers. Also admits to homicidal ideations and severe agitation recently. Has not acted on any of these commands. Reports that he is taking his psych meds as prescribed. Denies any substance use/abuse. Denies any recent fever, chills, sweats, nausea, vomiting, abdominal pain, shortness of breath or cough.     PAST MEDICAL / SURGICAL / SOCIAL / FAMILY HISTORY      has a past medical history of Depression and Schizophrenia ([facility] [address] has no past surgical history on file. Social History     Socioeconomic History    Marital status: Single     Spouse name: Not on file    Number of children: Not on file    Years of education: Not on file    Highest education level: Not on file   Occupational History    Not on file   Social Needs    Financial resource strain: Not on file    Food insecurity     Worry: Not on file     Inability: Not on file    Transportation needs     Medical: Not on file     Non-medical: Not on file   Tobacco Use    Smoking status: Current Every Day Smoker     Packs/day: 1.00    Smokeless tobacco: Never Used    Tobacco comment: pt is accepting of nicotine replacement    Substance and Sexual Activity    Alcohol use: No     Alcohol/week: 0.0 standard drinks     Comment: denies    Drug use: No     Comment: Denies drug use.  Sexual activity: Yes     Partners: Female   Lifestyle    Physical activity     Days per week: Not on file     Minutes per session: Not on file    Stress: Not on file   Relationships    Social connections     Talks on phone: Not on file     Gets together: Not on file     Attends Anabaptist service: Not on file     Active member of club or organization: Not on file     Attends meetings of clubs or organizations: Not on file     Relationship status: Not on file    Intimate partner violence     Fear of current or ex partner: Not on file     Emotionally abused: Not on file     Physically abused: Not on file     Forced sexual activity: Not on file   Other Topics Concern    Not on file   Social History Narrative    Not on file       Family History   Family history unknown: Yes       Allergies:  Patient has no known allergies. Home Medications:  Prior to Admission medications    Medication Sig Start Date End Date Taking?  Authorizing Provider   diphenhydrAMINE (BENADRYL) 25 MG capsule Take 1 capsule by mouth every 6 hours as needed for Itching prescribed. Social work did talk to patient. Admitted to missing last injection of Invega. Has been taking oral Invega regularly. Patient was screened and has no clinical signs or symptoms of a CoVID-19 infection at this time. However, given current pandemic and atypical presentations, face mask, eye protection, surgical cap, and gloves were worn during examination. Patient was wearing surgical mask. Patient sitting in bed comfortably no acute distress. Normal vitals. Heart regular rate and without murmur. Lungs clear to auscultate bilateral without wheezes rales or rhonchi. Abdomen soft, nontender nondistended. Patient demonstrating flat affect. Alert and oriented x3. GCS 15. Admits to suicidal and homicidal ideations. No evidence of traumatic injury or evidence of self-harm. Suspect schizophrenia, missed dose of injected Invega, homicidal ideations. Low suspicion for intoxication. Medically cleared at this time. Will have social work talk to patient. Likely inpatient admission for suicidal and homicidal ideations. At this time patient is voluntary but may need pink slipped if attempts to leave. ED Course as of May 24 1757   Sun May 24, 2020   1612 Patient wanting to leave. Discussed that feel like inpatient evaluation by a psychiatrist is in his best interest.     [ZT]      ED Course User Index  [ZT] Hussain Cisse DO     Plan to transfer to [facility]. Awaiting bed. CONSULTS:  IP CONSULT TO SOCIAL WORK        FINAL IMPRESSION      1. Suicidal ideations    2. Auditory hallucination          DISPOSITION / PLAN     DISPOSITION Decision To Transfer 05/24/2020 01:55:44 PM      PATIENT REFERRED TO:  No follow-up provider specified.     DISCHARGE MEDICATIONS:  New Prescriptions    No medications on file       Hussain Cisse DO  Emergency Medicine Resident    (Please note that portions of thisnote were completed with a voice recognition program.  Efforts were made to edit the

## 2020-05-24 NOTE — ED NOTES
belongings search:     Persons present during search:   Results of search and disposition:       Searchers Name: security     These items or items similar should be removed from the room:   [] Chairs   [] Telephone   [] Trash cans and liners   [] Plastic utensils (order Patient Safety tray)   [] Empty or remove Sharps containers   [] All personal clothing/belongings removed   [] All unnecessary lead wires, electrical cords, draw cords, etc.   [] Flowers and plants   [] Double check for lighters, matches, razors, any glass items etc that can be used as weapons. Person completing Checklist: Aida De La Torre       GENERAL INFORMATION     Y  N     [x] [] Has the patient been informed that they are on a watch and what that means? [x] [] Can the patient get out of Bed without nursing assistance? [x] [] Can the patient use the restroom without nursing assistance? [x] [] Can the patient walk the halls to [place] their legs? \"   [] [x] Does the patient have metal utensils? [x] [] Have the patient's belongings been placed out of control of the patient? [x] [] Have the patient and his/her belongings been checked for contraband? [x] [] Is the patient under any visitor restrictions? If Yes, explain:   [] [x] Is the patient under an alias? [facility] Name:   Authorized visitors (no more than two are to be on the list)   Name/Relationship:   Name/Relationship:    Name of Staff member that you  Received this information from?:     General Description:    Yumiko Washington BH31/BH31B male 27 y.o. Admission weight: 165 lb (74.8 kg)    Race: [x]  [] Black  []   []   [] [place] [] Other  Facial Hair:  [] Yes  [x] No  If yes, please describe: Identifying Marks (i.e. Visible tattoos, scars, etc... ):     NURSING CARE PLAN    Nursing Diagnosis: Risk of Self Directed Harm  [] Actual  [] Potential  Date Started: 5/24/20      Etiological Factors: (related to)  [x] Expressed or implied suicidal ideation/behavior  [x] Depression  [] Suicide attempt      [x] Low self-esteem  [] Hallucinations      [x] Feeling of Hopelessness  [] Substance abuse or withdrawal    [] Dysfunctional family  [] Major traumatic event, eg., divorce, etc   [] Excessive stress/anxiety    5/24/20    Expected Outcomes    Patient will:   [x] Patient will remain safe for the duration of their stay   [x] Patient's environment will be safe, eg. Free of potential suicide weapons   [] Verbalize Recovery from suicidal episode and improvement in self-worth   [x] Discuss feeling that precipitated suicide attempt/thoughts/behavior   [] Will describe available resources for crisis prevention and management   [] Will verbalize positive coping skills     Nursing Intervention   [x] Assessment and Observations hourly   [x] Suicide Precautions implemented with patient, should be 1:1 observation   [x] Document observation [id number] and RN assessment hourly   [] Consult physician for:    [] Psychiatric consult    [] Pharmacological therapy    [] Other:    [x] Patient search completed by security   [x] Initiated appropriate safety protocols by removing from the patient's environment anything that could be used to inflict self injury, eg. Order safe tray, snap gown, etc   [x] Maintain open, warm, caring, non-judgmental attitude/manner towards patient   [] Discuss advantages and disadvantages of existing coping methods/skills   [x] Assist and educate patient with identifying present strengths and coping skills   [x] Keep patient informed regarding plan of care and provide clear concise explanations. Provide the patient/family education information as well as telephone numbers and other information about crisis centers, hot lines, and counselors.     Discharge Planning:   [] Referral  [] Groups [] Health agencies  [] Other:          Sonam Evans RN  05/24/20 5401

## 2020-05-25 LAB
ALBUMIN SERPL-MCNC: 4.1 G/DL (ref 3.5–5.2)
ALBUMIN/GLOBULIN RATIO: ABNORMAL (ref 1–2.5)
ALP BLD-CCNC: 52 U/L (ref 40–129)
ALT SERPL-CCNC: 17 U/L (ref 5–41)
ANION GAP SERPL CALCULATED.3IONS-SCNC: 8 MMOL/L (ref 9–17)
AST SERPL-CCNC: 47 U/L
BILIRUB SERPL-MCNC: 0.29 MG/DL (ref 0.3–1.2)
BUN BLDV-MCNC: 11 MG/DL (ref 6–20)
BUN/CREAT BLD: ABNORMAL (ref 9–20)
CALCIUM SERPL-MCNC: 9.5 MG/DL (ref 8.6–10.4)
CHLORIDE BLD-SCNC: 105 MMOL/L (ref 98–107)
CO2: 28 MMOL/L (ref 20–31)
CREAT SERPL-MCNC: 0.67 MG/DL (ref 0.7–1.2)
GFR AFRICAN AMERICAN: >60 ML/MIN
GFR NON-AFRICAN AMERICAN: >60 ML/MIN
GFR SERPL CREATININE-BSD FRML MDRD: ABNORMAL ML/MIN/{1.73_M2}
GFR SERPL CREATININE-BSD FRML MDRD: ABNORMAL ML/MIN/{1.73_M2}
GLUCOSE BLD-MCNC: 89 MG/DL (ref 70–99)
POTASSIUM SERPL-SCNC: 4.5 MMOL/L (ref 3.7–5.3)
SODIUM BLD-SCNC: 141 MMOL/L (ref 135–144)
TOTAL PROTEIN: 7.1 G/DL (ref 6.4–8.3)

## 2020-05-25 PROCEDURE — 90792 PSYCH DIAG EVAL W/MED SRVCS: CPT | Performed by: NURSE PRACTITIONER

## 2020-05-25 PROCEDURE — U0003 INFECTIOUS AGENT DETECTION BY NUCLEIC ACID (DNA OR RNA); SEVERE ACUTE RESPIRATORY SYNDROME CORONAVIRUS 2 (SARS-COV-2) (CORONAVIRUS DISEASE [COVID-19]), AMPLIFIED PROBE TECHNIQUE, MAKING USE OF HIGH THROUGHPUT TECHNOLOGIES AS DESCRIBED BY CMS-2020-01-R: HCPCS

## 2020-05-25 PROCEDURE — 36415 COLL VENOUS BLD VENIPUNCTURE: CPT

## 2020-05-25 PROCEDURE — 6370000000 HC RX 637 (ALT 250 FOR IP): Performed by: NURSE PRACTITIONER

## 2020-05-25 PROCEDURE — 1240000000 HC EMOTIONAL WELLNESS R&B

## 2020-05-25 PROCEDURE — 6370000000 HC RX 637 (ALT 250 FOR IP): Performed by: PSYCHIATRY & NEUROLOGY

## 2020-05-25 PROCEDURE — 80053 COMPREHEN METABOLIC PANEL: CPT

## 2020-05-25 RX ORDER — BENZTROPINE MESYLATE 2 MG/1
2 TABLET ORAL DAILY PRN
Status: DISCONTINUED | OUTPATIENT
Start: 2020-05-25 | End: 2020-06-05 | Stop reason: HOSPADM

## 2020-05-25 RX ORDER — HYDROXYZINE HYDROCHLORIDE 25 MG/1
25 TABLET, FILM COATED ORAL 3 TIMES DAILY PRN
Status: DISCONTINUED | OUTPATIENT
Start: 2020-05-25 | End: 2020-06-05 | Stop reason: HOSPADM

## 2020-05-25 RX ORDER — TRAZODONE HYDROCHLORIDE 50 MG/1
50 TABLET ORAL NIGHTLY PRN
Status: DISCONTINUED | OUTPATIENT
Start: 2020-05-25 | End: 2020-06-05 | Stop reason: HOSPADM

## 2020-05-25 RX ORDER — NICOTINE 21 MG/24HR
1 PATCH, TRANSDERMAL 24 HOURS TRANSDERMAL DAILY
Status: DISCONTINUED | OUTPATIENT
Start: 2020-05-25 | End: 2020-06-05 | Stop reason: HOSPADM

## 2020-05-25 RX ORDER — PALIPERIDONE 6 MG/1
6 TABLET, EXTENDED RELEASE ORAL DAILY
Status: DISCONTINUED | OUTPATIENT
Start: 2020-05-25 | End: 2020-05-26

## 2020-05-25 RX ORDER — MAGNESIUM HYDROXIDE/ALUMINUM HYDROXICE/SIMETHICONE 120; 1200; 1200 MG/30ML; MG/30ML; MG/30ML
30 SUSPENSION ORAL 3 TIMES DAILY PRN
Status: DISCONTINUED | OUTPATIENT
Start: 2020-05-25 | End: 2020-06-05 | Stop reason: HOSPADM

## 2020-05-25 RX ADMIN — TRAZODONE HYDROCHLORIDE 50 MG: 50 TABLET ORAL at 20:36

## 2020-05-25 RX ADMIN — PALIPERIDONE 6 MG: 6 TABLET, FILM COATED, EXTENDED RELEASE ORAL at 20:36

## 2020-05-25 ASSESSMENT — LIFESTYLE VARIABLES: HISTORY_ALCOHOL_USE: NO

## 2020-05-25 NOTE — VIRTUAL HEALTH
treatment   Supportive therapy with medication management. Reviewed risks and benefits as well as potential side effects with patient.  Therapeutic activities and groups   Follow up at [facility] after symptoms stabilized   Started oral Invega 6 mg po daily on 5- until next dose of Kamlesh Hung is verified on 5- with Zepf    Estimated length of stay: 5-7 days    [address], APRN - CNP  Psychiatric Advanced Practice Nurse        Patient Location:  [address] [place] [address]    Provider Location ([facility]): [place], [place]    This virtual visit was conducted via interactive/real-time audio/video.

## 2020-05-25 NOTE — PROGRESS NOTES
use. Pt has hx of assault charges. Pt is planning to return to live at [facility] and continue outpatient care with [facility].

## 2020-05-26 PROCEDURE — 1240000000 HC EMOTIONAL WELLNESS R&B

## 2020-05-26 PROCEDURE — 6370000000 HC RX 637 (ALT 250 FOR IP): Performed by: REGISTERED NURSE

## 2020-05-26 PROCEDURE — 6370000000 HC RX 637 (ALT 250 FOR IP): Performed by: PSYCHIATRY & NEUROLOGY

## 2020-05-26 PROCEDURE — 99232 SBSQ HOSP IP/OBS MODERATE 35: CPT | Performed by: REGISTERED NURSE

## 2020-05-26 RX ORDER — CLOZAPINE 25 MG/1
75 TABLET ORAL DAILY
Status: DISCONTINUED | OUTPATIENT
Start: 2020-05-26 | End: 2020-05-29

## 2020-05-26 RX ORDER — CLOZAPINE 25 MG/1
75 TABLET ORAL DAILY
Status: ON HOLD | COMMUNITY
End: 2020-06-04 | Stop reason: HOSPADM

## 2020-05-26 RX ORDER — VALBENAZINE 80 MG/1
80 CAPSULE ORAL DAILY
Status: ON HOLD | COMMUNITY
End: 2020-06-04 | Stop reason: HOSPADM

## 2020-05-26 RX ORDER — BENZTROPINE MESYLATE 1 MG/1
1 TABLET ORAL DAILY
Status: DISCONTINUED | OUTPATIENT
Start: 2020-05-26 | End: 2020-06-05 | Stop reason: HOSPADM

## 2020-05-26 RX ADMIN — CLOZAPINE 75 MG: 25 TABLET ORAL at 11:58

## 2020-05-26 RX ADMIN — BENZTROPINE MESYLATE 1 MG: 1 TABLET ORAL at 11:58

## 2020-05-26 NOTE — PLAN OF CARE
Currently in Pain: No  Daily Nutrition (WDL): Within Defined Limits    Patient Monitoring:  Frequency of Checks: 4 times per hour, close    Psychiatric Symptoms:   Depression Symptoms  Depression Symptoms: Impaired concentration, Loss of interest, Isolative  Anxiety Symptoms  Anxiety Symptoms: Generalized  Tabatha Symptoms  Tabatha Symptoms: No problems reported or observed. Psychosis Symptoms  Delusion Type: No problems reported or observed.     Suicide Risk CSSR-S:  1) Within the past month, have you wished you were dead or wished you could go to sleep and not wake up? : No  2) Have you actually had any thoughts of killing yourself? : No  6) Have you ever done anything, started to do anything, or prepared to do anything to end your life?: No  Change in Result  Na  Change in Plan of care Na       EDUCATION:   EDUCATION:   Learner Progress Toward Treatment Goals: Reviewed results and recommendations of this team, Reviewed group plan and strategies, Reviewed signs, symptoms and risk of self harm and violent behavior, Reviewed goals and plan of care    Method:small group, individual verbal education    Outcome:verbalized by patient, but needs reinforcement to obtain goals    PATIENT GOALS:  Short term:Patient declined to attend his treatment team meeting   Long term:    PLAN/TREATMENT RECOMMENDATIONS UPDATE: continue with group therapies, increased socialization, continue planning for after discharge goals, continue with medication compliance    SHORT-TERM GOALS UPDATE:   Time frame for Short-Term Goals: 5-7 days    LONG-TERM GOALS UPDATE:   Time frame for Long-Term Goals: 6 months  Members Present in Team Meeting: See Signature Sheet    Vanesa Colindres

## 2020-05-26 NOTE — PROGRESS NOTES
Pharmacy Medication History Note      List of current medications patient is taking is complete. Source of information: [facility], [facility] [address] made to medication list:  Medications removed (include reason, ex. therapy complete or physician discontinued, noncompliance):  · Ibuprofen (list clean up), Diphenhydramine (list clean up), Trazodone (list clean up), Invega sustenna (alternate therapy), Nicotine (list clean up)    Medications added/doses adjusted:  · Added Clozapine 75 mg (three tablets) daily  · Added Ingrezza 80 mg daily  · Adjusted Benztropine to 1 mg daily    Other notes (ex. Recent course of antibiotics, Coumadin dosing):  · Patient's last [address] reported to Clozapine [facility] was 05/18/20. Current monitoring frequency is every 2 weeks. Please let me know if you have any questions about this encounter. Thank you!     Electronically signed by Bre Tierney, [facility] on 5/26/2020 at 10:23 AM

## 2020-05-26 NOTE — PLAN OF CARE
Problem: Altered Mood, Depressive Behavior:  Goal: Able to verbalize and/or display a decrease in depressive symptoms  Description: Able to verbalize and/or display a decrease in depressive symptoms  Outcome: Ongoing     Problem: Altered Mood, Depressive Behavior:  Goal: Ability to disclose and discuss suicidal ideas will improve  Description: Ability to disclose and discuss suicidal ideas will improve  Outcome: Ongoing     Problem: Depressive Behavior With or Without Suicide Precautions:  Goal: Absence of self-harm  Description: Absence of self-harm  Outcome: Ongoing     Patient denies suicidal homicidal ideations, denies hallucinations. He remains isolative to self and room much of the shift, out for needs and intermittent pacing only. He does not interact with his peers and provides staff with yes and no answers.  He displays a flat affect and verbalizes minimal insight, patient remains safe a Q 15 min safety checks/

## 2020-05-26 NOTE — VIRTUAL HEALTH
Chloride 105 98 - 107 mmol/L    CO2 28 20 - 31 mmol/L    Anion Gap 8 (L) 9 - 17 mmol/L    Alkaline Phosphatase 52 40 - 129 U/L    ALT 17 5 - 41 U/L    AST 47 (H) <40 U/L    Total Bilirubin 0.29 (L) 0.3 - 1.2 mg/dL    Total Protein 7.1 6.4 - 8.3 g/dL    Alb 4.1 3.5 - 5.2 g/dL    Albumin/Globulin Ratio NOT REPORTED 1.0 - 2.5    GFR Non-African American >60 >60 mL/min    GFR African American >60 >60 mL/min    GFR Comment          GFR Staging NOT REPORTED        Medications  Current Facility-Administered Medications   Medication Dose Route Frequency Provider Last Rate Last Dose    cloZAPine (CLOZARIL) tablet 75 mg  75 mg Oral Daily XAVIER Sanz - CNS   75 mg at 05/26/20 1158    Valbenazine Tosylate CAPS 80 mg  80 mg Oral Daily XAVIER Cedeño - CNS        benztropine (COGENTIN) tablet 1 mg  1 mg Oral Daily XAVIER Cedeño - CNS   1 mg at 05/26/20 1158    nicotine polacrilex (NICORETTE) gum 2 mg  2 mg Oral Q2H PRN Thien Kidd MD        traZODone (DESYREL) tablet 50 mg  50 mg Oral Nightly PRN Thien Kidd MD   50 mg at 05/25/20 2036    aluminum & magnesium hydroxide-simethicone (MAALOX) 200-200-20 MG/5ML suspension 30 mL  30 mL Oral TID PRN Thien Kidd MD        benztropine (COGENTIN) tablet 2 mg  2 mg Oral Daily PRN Thien Kidd MD        hydrOXYzine (ATARAX) tablet 25 mg  25 mg Oral TID PRN Thien Kidd MD        magnesium hydroxide (MILK OF MAGNESIA) 400 MG/5ML suspension 30 mL  30 mL Oral Nightly PRN Thien Kidd MD        nicotine (NICODERM CQ) 14 MG/24HR 1 patch  1 patch Transdermal Daily Thien Kidd MD   1 patch at 05/26/20 1157         cloZAPine  75 mg Oral Daily    Valbenazine Tosylate  80 mg Oral Daily    benztropine  1 mg Oral Daily    nicotine  1 patch Transdermal Daily       ASSESSMENT  Schizoaffective disorder (HCC)     Patient's Response to Treatment: slow.     PLAN  · Continue inpatient psychiatric treatment  · Supportive therapy with medication management. Reviewed risks and benefits as well as potential side effects with patient. Start Clozaril and discontinue Invega. · Therapeutic activities and groups  · [facility] after symptoms stabilized           Electronically signed by XAVIER Oliveros on 5/26/2020 at 4:33 PM.    Dragon voice recognition software used in portions of this document. Provider Location ([facility]/State): [place], [place]    This virtual visit was conducted via interactive/real-time audio/video.

## 2020-05-27 LAB
SARS-COV-2, PCR: NORMAL
SARS-COV-2, RAPID: NORMAL
SARS-COV-2: NOT DETECTED
SOURCE: NORMAL

## 2020-05-27 PROCEDURE — 99232 SBSQ HOSP IP/OBS MODERATE 35: CPT | Performed by: REGISTERED NURSE

## 2020-05-27 PROCEDURE — 6370000000 HC RX 637 (ALT 250 FOR IP): Performed by: REGISTERED NURSE

## 2020-05-27 PROCEDURE — 1240000000 HC EMOTIONAL WELLNESS R&B

## 2020-05-27 PROCEDURE — 6370000000 HC RX 637 (ALT 250 FOR IP): Performed by: PSYCHIATRY & NEUROLOGY

## 2020-05-27 RX ADMIN — BENZTROPINE MESYLATE 1 MG: 1 TABLET ORAL at 08:37

## 2020-05-27 RX ADMIN — CLOZAPINE 75 MG: 25 TABLET ORAL at 08:37

## 2020-05-27 RX ADMIN — TRAZODONE HYDROCHLORIDE 50 MG: 50 TABLET ORAL at 20:48

## 2020-05-27 ASSESSMENT — PAIN SCALES - GENERAL: PAINLEVEL_OUTOF10: 0

## 2020-05-27 NOTE — PLAN OF CARE
Problem: Altered Mood, Depressive Behavior:  Goal: Able to verbalize and/or display a decrease in depressive symptoms  Description: Able to verbalize and/or display a decrease in depressive symptoms  5/26/2020 2057 by Kumar Morales LPN  Outcome: Ongoing   Patient verbalized a decrease in depressive symptoms as evidenced by patient denying anxiety and depression. Patient denies visual and auditory hallucinations. Problem: Depressive Behavior With or Without Suicide Precautions:  Goal: Absence of self-harm  Description: Absence of self-harm  5/26/2020 2057 by Kumar Morales LPN  Outcome: Ongoing   Patient remains absent of self harm as evidenced by patient stated not currently having thoughts of harming himself or others, and will alert staff immediately if he develops a plan prior to acting on plan. 15 minute rounding for patient safety continued, will continue to monitor.

## 2020-05-27 NOTE — PROGRESS NOTES
@ [phone] [facility] and she informed clinician group home has been found health assessment and xray needs completed and Covid swab. Clinician met with patient for one on one this morning discussed group home placement found by Ze team and that an xray and covid swab will be needed for placement and patient in agreement with this and will have Covid swab done and health assessment sent to  for signature.

## 2020-05-27 NOTE — PLAN OF CARE
Problem: Altered Mood, Depressive Behavior:  Goal: Ability to disclose and discuss suicidal ideas will improve  Description: Ability to disclose and discuss suicidal ideas will improve  Outcome: Ongoing  Note: Mr. Mane Fraser reports he has not been sleeping well over the last couple nights. He notes \"I don't really like to sleep, but I do like waking up\". He currently denies suicidal ideation and thoughts of harm to self and others. He endorses auditory hallucinations occasionally, but is unable to describe what they are saying. He is malodorous and writer has encouraged Mr. Rocha to shower and change clothing. He has declined thus far, Valerie Robins will continue to encourage. Mr. Mane Fraser continues to refuse to be swabbed for Covid 19. Writer continues to encourage. Mr. Mane Fraser demonstrates bizarre, suspicious and paranoid behaviors, but has remained in good behavioral control thus far. Safety rounds maintained.

## 2020-05-28 PROCEDURE — 6370000000 HC RX 637 (ALT 250 FOR IP): Performed by: PSYCHIATRY & NEUROLOGY

## 2020-05-28 PROCEDURE — 1240000000 HC EMOTIONAL WELLNESS R&B

## 2020-05-28 PROCEDURE — 6370000000 HC RX 637 (ALT 250 FOR IP): Performed by: REGISTERED NURSE

## 2020-05-28 PROCEDURE — 99232 SBSQ HOSP IP/OBS MODERATE 35: CPT | Performed by: PSYCHIATRY & NEUROLOGY

## 2020-05-28 RX ADMIN — CLOZAPINE 75 MG: 25 TABLET ORAL at 13:48

## 2020-05-28 RX ADMIN — HYDROXYZINE HYDROCHLORIDE 25 MG: 25 TABLET, FILM COATED ORAL at 13:48

## 2020-05-28 RX ADMIN — BENZTROPINE MESYLATE 1 MG: 1 TABLET ORAL at 13:47

## 2020-05-28 NOTE — GROUP NOTE
Group Therapy Note    Date: 5/28/2020    Group Start Time: 1100  Group End Time: 5735  Group Topic: Healthy Living/Wellness    BERNARD Phan, EMILS    Pt did not attend 1100 wellness group d/t resting in room despite staff invitation to attend. 1:1 talk time offered as alternative to group session, pt declined.             Signature:  Renaldo Marie

## 2020-05-28 NOTE — H&P
HISTORY and Rowan Jones 5747       NAME:  Michael Miguel  MRN: [id number]   YOB: 1990   Date: 5/28/2020   Age: 27 y.o. Gender: male     COMPLAINT AND PRESENT HISTORY:      Michael Miguel is 27 y.o.,  male, admitted because of Schizoaffective Disorder. Cording to ER notes patient came in with suicidal ideation to cut his fingers. Also admits to auditory hallucinations. Upon speaking with patient he seemed to be unfocused and possibly responding to internal stimuli. Patient has some delay in responding when speaking to interview and appears to be looking around most of time. Patient does admit to auditory hallucinations and states that the  Voices are there all the time and they are very irritating. Patient admits to taking medication after writer suggesting if he took the injection. Patient admits to having racing thoughts. Patient admits to alcohol abuse but unable to tell writer how much.,  He denies any substance abuse. Patient reports that he is homeless. Patient does admit to weakness when asked about medical condition he states that he had a spinal injury as a child. Patient has unkept appearance. Patient denies any somatic complaints. Please see patient psychiatric history for more information. DIAGNOSTIC RESULTS       PAST MEDICAL HISTORY     Past Medical History:   Diagnosis Date    Depression     Schizophrenia ([facility] [address]      Pt denies any history of Diabetes mellitus type 2, hypertension, stroke, heart disease, COPD, Asthma, GERD, HLD, Cancer, Seizures,Thyroid disease, Kidney Disease, Hepatitis, TB.    SURGICAL HISTORY     No past surgical history on file.     FAMILY HISTORY       Family History   Family history unknown: Yes       SOCIAL HISTORY       Social History     Socioeconomic History    Marital status: Single     Spouse name: Not on file    Number of children: Not on file    Years of education: Not on file    Highest

## 2020-05-28 NOTE — PROGRESS NOTES
BEHAVIORAL HEALTH FOLLOW-UP NOTE     5/28/2020     Patient was seen and examined in person, Chart reviewed   Patient's case discussed with staff/team    Chief Complaint: Schizoaffective disorder  Interim History:     -Says he was hearing voices and decided to bring himself in. Denies any recreational drug use. -Started on Clozapine yesterday. The patient has been on both clozapine and paliperidone in the recent past.  He did not clear if he was taking both at the same time  -The patient appears distractible and internally stimulated    -Appetite:   [x] Normal/Unchanged  [] Increased  [] Decreased      Sleep:       [] Normal/Unchanged  [x] Fair       [] Poor              Energy:    [x] Normal/Unchanged  [] Increased  [] Decreased        Aggression:  [] yes  [x] no    Patient is [x] able  [] unable to CONTRACT FOR SAFETY ON THE UNIT    PAST MEDICAL/PSYCHIATRIC HISTORY:   Past Medical History:   Diagnosis Date    Depression     Schizophrenia ([facility] [address]        FAMILY/SOCIAL HISTORY:  Family History   Family history unknown: Yes     Social History     Socioeconomic History    Marital status: Single     Spouse name: Not on file    Number of children: Not on file    Years of education: Not on file    Highest education level: Not on file   Occupational History    Not on file   Social Needs    Financial resource strain: Not on file    Food insecurity     Worry: Not on file     Inability: Not on file    Transportation needs     Medical: Not on file     Non-medical: Not on file   Tobacco Use    Smoking status: Current Every Day Smoker     Packs/day: 1.00    Smokeless tobacco: Never Used    Tobacco comment: pt is accepting of nicotine replacement    Substance and Sexual Activity    Alcohol use: No     Alcohol/week: 0.0 standard drinks     Comment: denies    Drug use: No     Comment: Denies drug use.     Sexual activity: Yes     Partners: Female   Lifestyle    Physical activity     Days per week: Not on file Minutes per session: Not on file    Stress: Not on file   Relationships    Social connections     Talks on phone: Not on file     Gets together: Not on file     Attends Sabianist service: Not on file     Active member of club or organization: Not on file     Attends meetings of clubs or organizations: Not on file     Relationship status: Not on file    Intimate partner violence     Fear of current or ex partner: Not on file     Emotionally abused: Not on file     Physically abused: Not on file     Forced sexual activity: Not on file   Other Topics Concern    Not on file   Social History Narrative    Not on file           ROS:  [x] All negative/unchanged except if checked.  Explain positive(checked items) below:  [] Constitutional  [] Eyes  [] Ear/Nose/Mouth/Throat  [] Respiratory  [] CV  [] GI  []   [] Musculoskeletal  [] Skin/Breast  [] Neurological  [] Endocrine  [] Heme/Lymph  [] Allergic/Immunologic    Explanation:     MEDICATIONS:    Current Facility-Administered Medications:     cloZAPine (CLOZARIL) tablet 75 mg, 75 mg, Oral, Daily, XAVIER Cedeño - CNS, 75 mg at 05/27/20 0837    Valbenazine Tosylate CAPS 80 mg, 80 mg, Oral, Daily, XAVIER Cedeño    benztropine (COGENTIN) tablet 1 mg, 1 mg, Oral, Daily, XAVIER Cedeño, 1 mg at 05/27/20 0837    nicotine polacrilex (NICORETTE) gum 2 mg, 2 mg, Oral, Q2H PRN, Nahomi Frank MD    traZODone (DESYREL) tablet 50 mg, 50 mg, Oral, Nightly PRN, Nahomi Frank MD, 50 mg at 05/27/20 2048    aluminum & magnesium hydroxide-simethicone (MAALOX) 200-200-20 MG/5ML suspension 30 mL, 30 mL, Oral, TID PRN, Nahomi Frank MD    benztropine (COGENTIN) tablet 2 mg, 2 mg, Oral, Daily PRN, Nahomi Frank MD    hydrOXYzine (ATARAX) tablet 25 mg, 25 mg, Oral, TID PRN, Nahomi Frank MD    magnesium hydroxide (MILK OF MAGNESIA) 400 MG/5ML suspension 30 mL, 30 mL, Oral, Nightly PRN, Nahomi Frank MD    nicotine (NICODERM CQ) 14 to treatment were discussed. The patient  consented to treatment. Encourage patient to attend group and other milieu activities. Discharge planning discussed with the patient and treatment team.    PSYCHOTHERAPY/COUNSELING:  [] Therapeutic interview  [x] Supportive  [] CBT  [] Ongoing  [] Other    [x] Patient continues to need, on a daily basis, active treatment furnished directly by or requiring the supervision of inpatient psychiatric personnel      Anticipated Length of stay:5-7 days. Manuelito Gage is a 27 y.o. male being evaluated by a Virtual Visit (video visit) encounter to address concerns as mentioned above. A caregiver was present in the room along with the patient. Pursuant to the emergency declaration under the [facility], [address] [place] [address] authority and the [employer] Act, this Virtual Visit was conducted with patient's (and/or legal guardian's) consent, to reduce the patient's risk of exposure to COVID-19 and provide necessary medical care. Services were provided through a video synchronous discussion virtually to substitute for in-person visit by provider. Patient is present at [facility]  and I am physically present at my home in [place] [address], [place] on 5/28/2020 at 11:38 AM    An electronic signature was used to authenticate this note. **This report has been created using voice recognition software. It may contain minor errors which are inherent in voice recognition technology. **

## 2020-05-28 NOTE — PLAN OF CARE
Problem: Altered Mood, Depressive Behavior:  Goal: Able to verbalize and/or display a decrease in depressive symptoms  Description: Able to verbalize and/or display a decrease in depressive symptoms  5/27/2020 2042 by Tabby Hirsch RN  Outcome: Ongoing     Problem: Altered Mood, Depressive Behavior:  Goal: Ability to disclose and discuss suicidal ideas will improve  Description: Ability to disclose and discuss suicidal ideas will improve  5/27/2020 2042 by Tabby Hirsch RN  Outcome: Ongoing  Patient denies suicidal ideation, homicidal ideation and hallucinations at this time. Safety plan reviewed with patient, agrees to approach staff when feeling upset. 15 minute and random checks maintained for safety. No violent or escalating behaviors noted during this shift. Patient is currently calm, controlled and medication-compliant. Remains isolative to room. Aloof of peers. Problem: Depressive Behavior With or Without Suicide Precautions:  Goal: Able to verbalize support systems  Description: Able to verbalize support systems  5/27/2020 2042 by Tabby Hirsch RN  Outcome: Ongoing  Patient does not speak about his supports at this time. Problem: Depressive Behavior With or Without Suicide Precautions:  Goal: Absence of self-harm  Description: Absence of self-harm  5/27/2020 2042 by Tabby Hirsch RN  Outcome: Ongoing  Patient has not attempted to harm self at this time.        Problem: Tobacco Use:  Goal: Inpatient tobacco use cessation counseling participation  Description: Inpatient tobacco use cessation counseling participation  5/27/2020 2042 by Tabby Hirsch RN  Outcome: Ongoing  Discussed with patient the benefits to quitting smoking and potential dangers of tobacco.

## 2020-05-29 PROCEDURE — 99232 SBSQ HOSP IP/OBS MODERATE 35: CPT | Performed by: PSYCHIATRY & NEUROLOGY

## 2020-05-29 PROCEDURE — 1240000000 HC EMOTIONAL WELLNESS R&B

## 2020-05-29 PROCEDURE — 6370000000 HC RX 637 (ALT 250 FOR IP): Performed by: REGISTERED NURSE

## 2020-05-29 PROCEDURE — 6370000000 HC RX 637 (ALT 250 FOR IP): Performed by: PSYCHIATRY & NEUROLOGY

## 2020-05-29 RX ORDER — CLOZAPINE 100 MG/1
100 TABLET ORAL DAILY
Status: DISCONTINUED | OUTPATIENT
Start: 2020-05-30 | End: 2020-06-01

## 2020-05-29 RX ADMIN — BENZTROPINE MESYLATE 1 MG: 1 TABLET ORAL at 09:31

## 2020-05-29 RX ADMIN — CLOZAPINE 75 MG: 25 TABLET ORAL at 09:31

## 2020-05-29 ASSESSMENT — PAIN - FUNCTIONAL ASSESSMENT: PAIN_FUNCTIONAL_ASSESSMENT: 0-10

## 2020-05-29 NOTE — PROGRESS NOTES
use.    Sexual activity: Yes     Partners: Female   Lifestyle    Physical activity     Days per week: Not on file     Minutes per session: Not on file    Stress: Not on file   Relationships    Social connections     Talks on phone: Not on file     Gets together: Not on file     Attends Cheondoism service: Not on file     Active member of club or organization: Not on file     Attends meetings of clubs or organizations: Not on file     Relationship status: Not on file    Intimate partner violence     Fear of current or ex partner: Not on file     Emotionally abused: Not on file     Physically abused: Not on file     Forced sexual activity: Not on file   Other Topics Concern    Not on file   Social History Narrative    Not on file           ROS:  [x] All negative/unchanged except if checked.  Explain positive(checked items) below:  [] Constitutional  [] Eyes  [] Ear/Nose/Mouth/Throat  [] Respiratory  [] CV  [] GI  []   [] Musculoskeletal  [] Skin/Breast  [] Neurological  [] Endocrine  [] Heme/Lymph  [] Allergic/Immunologic    Explanation:     MEDICATIONS:    Current Facility-Administered Medications:     cloZAPine (CLOZARIL) tablet 75 mg, 75 mg, Oral, Daily, XAVIER Cedeño - CNS, 75 mg at 05/29/20 0931    benztropine (COGENTIN) tablet 1 mg, 1 mg, Oral, Daily, XAVIER Cedeño, 1 mg at 05/29/20 0931    nicotine polacrilex (NICORETTE) gum 2 mg, 2 mg, Oral, Q2H PRN, Lacey Loving MD    traZODone (DESYREL) tablet 50 mg, 50 mg, Oral, Nightly PRN, Lacey Loving MD, 50 mg at 05/27/20 2048    aluminum & magnesium hydroxide-simethicone (MAALOX) 200-200-20 MG/5ML suspension 30 mL, 30 mL, Oral, TID PRN, Lacey Loving MD    benztropine (COGENTIN) tablet 2 mg, 2 mg, Oral, Daily PRN, Lacey Loving MD    hydrOXYzine (ATARAX) tablet 25 mg, 25 mg, Oral, TID PRN, Lacey Loving MD, 25 mg at 05/28/20 1348    magnesium hydroxide (MILK OF MAGNESIA) 400 MG/5ML suspension 30 mL, 30 mL, Oral,

## 2020-05-30 PROCEDURE — 99232 SBSQ HOSP IP/OBS MODERATE 35: CPT | Performed by: NURSE PRACTITIONER

## 2020-05-30 PROCEDURE — 6370000000 HC RX 637 (ALT 250 FOR IP): Performed by: PSYCHIATRY & NEUROLOGY

## 2020-05-30 PROCEDURE — 1240000000 HC EMOTIONAL WELLNESS R&B

## 2020-05-30 PROCEDURE — 6370000000 HC RX 637 (ALT 250 FOR IP): Performed by: REGISTERED NURSE

## 2020-05-30 RX ADMIN — BENZTROPINE MESYLATE 1 MG: 1 TABLET ORAL at 08:32

## 2020-05-30 RX ADMIN — CLOZAPINE 100 MG: 100 TABLET ORAL at 08:32

## 2020-05-30 NOTE — GROUP NOTE
Group Therapy Note    Date: 5/30/2020    Group Start Time: 1330  Group End Time: 1400  Group Topic: Psychoeducation    [facility]    Claudeen Dial    Patient refused to attend cognitive skills/ wellness education group at 1330 after encouragement from staff. 1:1 talk time provided by staff.      Signature:  Claudeen Dial

## 2020-05-31 PROCEDURE — 6370000000 HC RX 637 (ALT 250 FOR IP): Performed by: REGISTERED NURSE

## 2020-05-31 PROCEDURE — 6370000000 HC RX 637 (ALT 250 FOR IP): Performed by: PSYCHIATRY & NEUROLOGY

## 2020-05-31 PROCEDURE — 99232 SBSQ HOSP IP/OBS MODERATE 35: CPT | Performed by: NURSE PRACTITIONER

## 2020-05-31 PROCEDURE — 1240000000 HC EMOTIONAL WELLNESS R&B

## 2020-05-31 RX ADMIN — BENZTROPINE MESYLATE 1 MG: 1 TABLET ORAL at 08:30

## 2020-05-31 RX ADMIN — CLOZAPINE 100 MG: 100 TABLET ORAL at 08:30

## 2020-05-31 NOTE — GROUP NOTE
Group Therapy Note    Date: 5/31/2020    Group Start Time: 0900  Group End Time: 0915  Group Topic: Community Meeting    BERNARD Moe    Patient refused to attend community meeting/ goals group at 0900 after encouragement from staff. 1:1 talk time provided by staff.      Signature:  Leonardo Moe

## 2020-05-31 NOTE — GROUP NOTE
Group Therapy Note    Date: 5/31/2020    Group Start Time: 1330  Group End Time: 6935  Group Topic: Psychoeducation    BERNARD Tabor    patient refused to attend mental health education/ social skills group at 1330 after encouragement from staff. 1:1 talk time provided by staff.         Signature:  Gene Tabor

## 2020-05-31 NOTE — VIRTUAL HEALTH
None    PLAN  · Continue inpatient psychiatric treatment  · Supportive therapy with medication management. Reviewed risks and benefits as well as potential side effects with patient. · Review medications for efficacy and side effects. · Therapeutic support and empathetic care provided. · Engage in therapeutic activities and groups. · [facility] up at [facility] after symptoms stabilized. Anticipated Discharge Date: TBD - when group home placement is found    Patient's Response to Treatment: positive    Wilfridadiel Blanca  5/31/2020  1:55 PM       Patient Location:  [address] [place] [address]    Provider Location ([facility]):   [place], [place]    This virtual visit was conducted via interactive/real-time audio/video.

## 2020-05-31 NOTE — PLAN OF CARE
Patient denies thoughts of suicidal and homicidal ideations at this time. Verbally agrees to remain safe while on the unit. Will seek staff if thoughts arise. Patient refusing to care for ADLs or tend to hygiene at this time. Patient is accepting of all medications, eating and sleeping okay. Patient encouraged to actively participate in group therapy sessions. Q15 minute safety checks maintained. Will continue to monitor.    Problem: Altered Mood, Depressive Behavior:  Goal: Able to verbalize and/or display a decrease in depressive symptoms  Description: Able to verbalize and/or display a decrease in depressive symptoms  Outcome: Ongoing     Problem: Depressive Behavior With or Without Suicide Precautions:  Goal: Able to verbalize support systems  Description: Able to verbalize support systems  Outcome: Ongoing

## 2020-06-01 ENCOUNTER — APPOINTMENT (OUTPATIENT)
Dept: GENERAL RADIOLOGY | Age: 30
DRG: 885 | End: 2020-06-01
Attending: PSYCHIATRY & NEUROLOGY
Payer: COMMERCIAL

## 2020-06-01 LAB
ABSOLUTE EOS #: 0.3 K/UL (ref 0–0.4)
ABSOLUTE IMMATURE GRANULOCYTE: ABNORMAL K/UL (ref 0–0.3)
ABSOLUTE LYMPH #: 1.51 K/UL (ref 1–4.8)
ABSOLUTE MONO #: 0.43 K/UL (ref 0.1–1.3)
ATYPICAL LYMPHOCYTE ABSOLUTE COUNT: 0.09 K/UL
ATYPICAL LYMPHOCYTES: 2 %
BASOPHILS # BLD: 0 % (ref 0–2)
BASOPHILS ABSOLUTE: 0 K/UL (ref 0–0.2)
DIFFERENTIAL TYPE: ABNORMAL
EOSINOPHILS RELATIVE PERCENT: 7 % (ref 0–4)
HCT VFR BLD CALC: 43.4 % (ref 41–53)
HEMOGLOBIN: 15 G/DL (ref 13.5–17.5)
IMMATURE GRANULOCYTES: ABNORMAL %
LYMPHOCYTES # BLD: 35 % (ref 24–44)
MCH RBC QN AUTO: 32.1 PG (ref 26–34)
MCHC RBC AUTO-ENTMCNC: 34.6 G/DL (ref 31–37)
MCV RBC AUTO: 92.7 FL (ref 80–100)
MONOCYTES # BLD: 10 % (ref 1–7)
MORPHOLOGY: ABNORMAL
NRBC AUTOMATED: ABNORMAL PER 100 WBC
PDW BLD-RTO: 15.2 % (ref 11.5–14.9)
PLATELET # BLD: 226 K/UL (ref 150–450)
PLATELET ESTIMATE: ABNORMAL
PMV BLD AUTO: 7.5 FL (ref 6–12)
RBC # BLD: 4.69 M/UL (ref 4.5–5.9)
RBC # BLD: ABNORMAL 10*6/UL
SEG NEUTROPHILS: 46 % (ref 36–66)
SEGMENTED NEUTROPHILS ABSOLUTE COUNT: 1.97 K/UL (ref 1.3–9.1)
WBC # BLD: 4.3 K/UL (ref 3.5–11)
WBC # BLD: ABNORMAL 10*3/UL

## 2020-06-01 PROCEDURE — 1240000000 HC EMOTIONAL WELLNESS R&B

## 2020-06-01 PROCEDURE — 99232 SBSQ HOSP IP/OBS MODERATE 35: CPT | Performed by: PSYCHIATRY & NEUROLOGY

## 2020-06-01 PROCEDURE — 85025 COMPLETE CBC W/AUTO DIFF WBC: CPT

## 2020-06-01 PROCEDURE — 6370000000 HC RX 637 (ALT 250 FOR IP): Performed by: PSYCHIATRY & NEUROLOGY

## 2020-06-01 PROCEDURE — 36415 COLL VENOUS BLD VENIPUNCTURE: CPT

## 2020-06-01 PROCEDURE — 6370000000 HC RX 637 (ALT 250 FOR IP): Performed by: REGISTERED NURSE

## 2020-06-01 PROCEDURE — 71045 X-RAY EXAM CHEST 1 VIEW: CPT

## 2020-06-01 RX ADMIN — TRAZODONE HYDROCHLORIDE 50 MG: 50 TABLET ORAL at 20:56

## 2020-06-01 RX ADMIN — CLOZAPINE 100 MG: 100 TABLET ORAL at 09:19

## 2020-06-01 RX ADMIN — BENZTROPINE MESYLATE 1 MG: 1 TABLET ORAL at 09:19

## 2020-06-01 ASSESSMENT — PAIN SCALES - GENERAL: PAINLEVEL_OUTOF10: 0

## 2020-06-01 NOTE — PROGRESS NOTES
Ht 6' 5\" (1.956 m)   Wt 165 lb (74.8 kg)   SpO2 99%   BMI 19.57 kg/m²   Gait - steady  Medication side effects(SE): none    Mental Status Examination:    Level of consciousness:  within normal limits   Appearance:  poor grooming and poor hygiene  Behavior/Motor: guarded and evasive   Attitude toward examiner:  cooperative  Speech:  normal volume, slowe and poverty of speech   Mood: constricted  Affect:  mood congruent  Thought processes:  linear, goal directed and coherent   Thought content:  Homicidal ideation - none  Suicidal Ideation:  denies suicidal ideation  Delusions:  paranoid  Perceptual Disturbance: denies  Cognition:  oriented to person, place, and time   Concentration distractible  Insight poor   Judgement poor     ASSESSMENT:   Patient symptoms are:  [] Well controlled  [x] Improving  [] Worsening  [] No change      Diagnosis:   Principal Problem:    [facility]ffective [facility]  Resolved Problems:    * No resolved hospital problems. *      LABS:    Recent Labs     06/01/20  0615   WBC 4.3   HGB 15.0        No results for input(s): NA, K, CL, CO2, BUN, CREATININE, GLUCOSE in the last 72 hours. No results for input(s): BILITOT, ALKPHOS, AST, ALT in the last 72 hours. Lab Results   Component Value Date    BARBSCNU NEGATIVE 07/20/2017    LABBENZ NEGATIVE 07/20/2017    LABMETH NEGATIVE 07/20/2017    PPXUR NOT REPORTED 07/20/2017     No results found for: TSH, FREET4  No results found for: LITHIUM  No results found for: VALPROATE, CBMZ    RISK ASSESSMENT: Moderate risk of aggression. Moderate risk of suicide. Risk of arson on discharge. Treatment Plan:  Reviewed current Medications with the patient. Denies any side effects. We increase Clozapine to 125mg qhs. Risks, benefits, side effects, drug-to-drug interactions and alternatives to treatment were discussed. The patient  consented to treatment. CXR and TB test ordered for placement.      Encourage patient to attend group and other milieu activities. Discharge planning discussed with the patient and treatment team.    PSYCHOTHERAPY/COUNSELING:  [] Therapeutic interview  [x] Supportive  [] CBT  [] Ongoing  [] Other    [x] Patient continues to need, on a daily basis, active treatment furnished directly by or requiring the supervision of inpatient psychiatric personnel      Anticipated Length of stay:2-3 days. Mely Cooley is a 27 y.o. male being evaluated by a Virtual Visit (video visit) encounter to address concerns as mentioned above. A caregiver was present in the room along with the patient. Pursuant to the emergency declaration under the [address] [place] [address], [address] [place] [address] authority and the [employer] Act, this Virtual Visit was conducted with patient's (and/or legal guardian's) consent, to reduce the patient's risk of exposure to COVID-19 and provide necessary medical care. Services were provided through a video synchronous discussion virtually to substitute for in-person visit by provider. Patient is present at [facility]  and I am physically present at my home in [place] [address], [place] on 6/1/2020 at 2:56 PM    An electronic signature was used to authenticate this note. **This report has been created using voice recognition software. It may contain minor errors which are inherent in voice recognition technology. **

## 2020-06-01 NOTE — GROUP NOTE
Group Therapy Note    Date: 6/1/2020    Group Start Time: 9566  Group End Time: 5598  Group Topic: Cognitive Skills    BERNARD Ndiaye, CTRS        Group Therapy Note    Attendees: 6/17         Pt did not participate in Cognitive Skills Group at [phone] when encouraged by RT due to resting in room. Pt was offered talk time as an alternative to group but declined.        Discipline Responsible: Psychoeducational Specialist      Signature:  Reginald Liu

## 2020-06-01 NOTE — PLAN OF CARE
Problem: Depressive Behavior With or Without Suicide Precautions:  Goal: Absence of self-harm  Description: Absence of self-harm  Outcome: Ongoing     Problem: Altered Mood, Depressive Behavior:  Goal: Ability to disclose and discuss suicidal ideas will improve  Description: Ability to disclose and discuss suicidal ideas will improve  Outcome: Ongoing   Patient denies suicidal ideas at this time. Patient denies homicidal ideas at this time. Patient denies depressive symptoms at this time. Patient has been in room. Patient is free of self harm at this time. Patient agrees to seek out staff if thoughts to harm self arise. Staff will provide support and reassurance as needed. Safety checks maintained every 15 minutes.

## 2020-06-01 NOTE — GROUP NOTE
Group Therapy Note    Date: 6/1/2020    Group Start Time: 1100  Group End Time: 5413  Group Topic: Cognitive Skills    CALLIE Kitchen    Pt did not attend 1100 skills group d/t resting in room despite staff invitation to attend. 1:1 talk time offered as alternative to group session, pt declined.          Signature:  Dwain Krishna

## 2020-06-02 PROCEDURE — 86481 TB AG RESPONSE T-CELL SUSP: CPT

## 2020-06-02 PROCEDURE — 1240000000 HC EMOTIONAL WELLNESS R&B

## 2020-06-02 PROCEDURE — 6370000000 HC RX 637 (ALT 250 FOR IP): Performed by: PSYCHIATRY & NEUROLOGY

## 2020-06-02 PROCEDURE — 99232 SBSQ HOSP IP/OBS MODERATE 35: CPT | Performed by: PSYCHIATRY & NEUROLOGY

## 2020-06-02 PROCEDURE — 36415 COLL VENOUS BLD VENIPUNCTURE: CPT

## 2020-06-02 PROCEDURE — 6370000000 HC RX 637 (ALT 250 FOR IP): Performed by: REGISTERED NURSE

## 2020-06-02 RX ADMIN — CLOZAPINE 125 MG: 25 TABLET ORAL at 08:08

## 2020-06-02 RX ADMIN — BENZTROPINE MESYLATE 1 MG: 1 TABLET ORAL at 08:09

## 2020-06-02 ASSESSMENT — PAIN SCALES - GENERAL: PAINLEVEL_OUTOF10: 0

## 2020-06-02 NOTE — GROUP NOTE
Group Therapy Note    Date: 6/2/2020    Group Start Time: 0900  Group End Time: 4302  Group Topic: Community Meeting    CALLIE Irvin        Group Therapy Note    Attendees: 9      Patient's Goal:  To improve goal setting skills       Status After Intervention:  Improved    Participation Level:  Active Listener    Participation Quality: Appropriate      Speech:  normal      Thought Process/Content: disorganized       Affective Functioning: flat       Mood: depressed       Level of consciousness:  Alert       Response to Learning: Able to verbalize current knowledge/experience and Progressing to goal      Endings: None Reported    Modes of Intervention: Education and Support      Discipline Responsible: Psychoeducational Specialist      Signature:  Estanislado Mohs

## 2020-06-02 NOTE — PLAN OF CARE
Problem: Tobacco Use:  Goal: Inpatient tobacco use cessation counseling participation  Description: Inpatient tobacco use cessation counseling participation  Outcome: Ongoing  Note: Patient given tobacco quitline number 08788495215 at this time, refusing to call at this time, states \" I just dont want to quit now\"- patient given information as to the dangers of long term tobacco use. Continue to reinforce the importance of tobacco cessation. Problem: Depressive Behavior With or Without Suicide Precautions:  Goal: Able to verbalize and/or display a decrease in depressive symptoms  Description: Able to verbalize and/or display a decrease in depressive symptoms  Outcome: Ongoing  Note: Patient denies feeling depressed, but isolates self to room and is out for meals only. Goal: Ability to disclose and discuss suicidal ideas will improve  Description: Ability to disclose and discuss suicidal ideas will improve  Outcome: Ongoing  Note: Pt denies thoughts of self harm and is agreeable to seeking out should thoughts of self harm arise. Safe environment maintained. Q15 minute checks for safety cont per unit policy. Will cont to monitor for safety and provides support and reassurance as needed. Goal: Participates in care planning  Description: Participates in care planning  Outcome: Ongoing  Note: Patient met with the doctor in a televisit 6/1/2020 after much encouragement from staff. Patient is refusing to attend any treatment team meetings. Problem: Tobacco Use:  Intervention: Tobacco-use cessation counseling  Note: Patient is receiving a 14mg transdermal nicotine patch for smoking cessation per orders. Problem: Depressive Behavior With or Without Suicide Precautions:  Intervention: Assess psychological signs and symptoms of depression  Note: Patient is depressed as evidenced by flat affect, withdrawn, guarded, isolative to self and room, and aloof of peers in the day area.   Patient is refusing to

## 2020-06-02 NOTE — PROGRESS NOTES
team.    PSYCHOTHERAPY/COUNSELING:  [] Therapeutic interview  [x] Supportive  [] CBT  [] Ongoing  [] Other    [x] Patient continues to need, on a daily basis, active treatment furnished directly by or requiring the supervision of inpatient psychiatric personnel      Anticipated Length of stay:2-3 days. Adelaida Loomis is a 27 y.o. male being evaluated by a Virtual Visit (video visit) encounter to address concerns as mentioned above. A caregiver was present in the room along with the patient. Pursuant to the emergency declaration under the [address] [place] [address], [address] [place] [address] authority and the [employer] and [employer] Act, this Virtual Visit was conducted with patient's (and/or legal guardian's) consent, to reduce the patient's risk of exposure to COVID-19 and provide necessary medical care. Services were provided through a video synchronous discussion virtually to substitute for in-person visit by provider. Patient is present at [facility]  and I am physically present at my home in [place] [address], [place] on 6/2/2020 at 12:20 PM    An electronic signature was used to authenticate this note. **This report has been created using voice recognition software. It may contain minor errors which are inherent in voice recognition technology. **

## 2020-06-02 NOTE — PLAN OF CARE
[facility] Interdisciplinary Treatment Plan Note     Review Date & Time: 6/2/2020  1309    Admission Type:   Admission Type: Voluntary    Reason for admission:  Reason for Admission: Voluntary from STVs with SI to cut self and command AH. Zepf client. Denies all upon admission    Estimated Length of Stay Update:  Est 3-7 days, to be determined by physician  Estimated Discharge Date Update: to be determined by physician    PATIENT STRENGTHS:  Patient Strengths:Strengths: No significant Physical Illness  Patient Strengths and Limitations:Limitations: Multiple barriers to leisure interests, Lacks leisure interests, Tendency to isolate self, Difficulty problem solving/relies on others to help solve problems, Difficult relationships / poor social skills  Addictive Behavior:Addictive Behavior  In the past 3 months, have you felt or has someone told you that you have a problem with:  : None  Do you have a history of Chemical Use?: No  Do you have a history of Alcohol Use?: No  Do you have a history of Street Drug Abuse?: No  Histroy of Prescripton Drug Abuse?: No  Medical Problems:   Past Medical History:   Diagnosis Date    Depression     [facility]izophren[facility] ([facility]        Risk:  Fall RiskTotal: 53  Jean Scale Jean Scale Score: 22  BVC Total: 0  Change in scores no. Changes to plan of Care  no    Status EXAM:   Status and Exam  Normal: No  Facial Expression: Avoids Gaze, Flat  Affect: Appropriate  Level of Consciousness: Alert  Mood:Normal: No  Mood: Depressed  Motor Activity:Normal: No  Motor Activity: Decreased  Interview Behavior: Cooperative, Evasive  Preception: [place] to Person, Marcela Bonds to Time, [place] to Place  Attention:Normal: No  Attention: Distractible  Thought Processes: Blocking  Thought Content:Normal: No  Thought Content: Poverty of Content  Hallucinations:  Other (Comment)(appears to be responding to internal stimuli)  Delusions: No  Memory:Normal: No  Memory: Poor Recent  Insight and Judgment: No  Insight and Judgment: Poor Judgment, Poor Insight, Unmotivated  Present Suicidal Ideation: No  Present Homicidal Ideation: No    Daily Assessment Last Entry:   Daily Sleep (WDL): Within Defined Limits         Patient Currently in Pain: Denies  Daily Nutrition (WDL): Within Defined Limits    Patient Monitoring:  Frequency of Checks: 4 times per hour, close    Psychiatric Symptoms:   Depression Symptoms  Depression Symptoms: Change in energy level, Isolative, Loss of interest  Anxiety Symptoms  Anxiety Symptoms: No problems reported or observed. Tabatha Symptoms  Tabatha Symptoms: No problems reported or observed. Psychosis Symptoms  Delusion Type: No problems reported or observed.     Suicide Risk CSSR-S:  1) Within the past month, have you wished you were dead or wished you could go to sleep and not wake up? : No  2) Have you actually had any thoughts of killing yourself? : No  6) Have you ever done anything, started to do anything, or prepared to do anything to end your life?: No  Change in Result no  Change in Plan of care no    EDUCATION:   Learner Progress Toward Treatment Goals: Reviewed results and recommendations of this team    Method: Group    Outcome: Refused Education    PATIENT GOALS: pt refuses to attend tx planning to develop goals     PLAN/TREATMENT RECOMMENDATIONS UPDATE:   COPING SKILLS CONTINUE WITH GROUP THERAPIES POSITIVE INTERACTIONS, GOAL SETTING    SHORT-TERM GOALS UPDATE:  Time frame for Short-Term Goals: 1-2 WEEKS     LONG-TERM GOALS UPDATE:  Time frame for Long-Term Goals: 6 MONTHS  Members Present in Team Meeting: See Signature Sheet    [address] Veronica RODAS, CTRS

## 2020-06-02 NOTE — GROUP NOTE
Group Therapy Note    Date: 6/2/2020    Group Start Time: 1430  Group End Time: 2815  Group Topic: Recovery    [facility] D    OJHN Woodard, MARCELA        Group Therapy Note    Attendees: 4/15    patient refused to attend Recovery group at 1430 after encouragement from staff.            Signature:  JOHN Woodard LSW

## 2020-06-03 PROCEDURE — 6370000000 HC RX 637 (ALT 250 FOR IP): Performed by: PSYCHIATRY & NEUROLOGY

## 2020-06-03 PROCEDURE — 99232 SBSQ HOSP IP/OBS MODERATE 35: CPT | Performed by: PSYCHIATRY & NEUROLOGY

## 2020-06-03 PROCEDURE — 6370000000 HC RX 637 (ALT 250 FOR IP): Performed by: REGISTERED NURSE

## 2020-06-03 PROCEDURE — 1240000000 HC EMOTIONAL WELLNESS R&B

## 2020-06-03 RX ADMIN — BENZTROPINE MESYLATE 1 MG: 1 TABLET ORAL at 08:59

## 2020-06-03 RX ADMIN — CLOZAPINE 125 MG: 25 TABLET ORAL at 08:59

## 2020-06-03 NOTE — GROUP NOTE
Group Therapy Note    Date: 6/3/2020    Group Start Time: 1100  Group End Time: 9969  Group Topic: Cognitive Skills    [facility] D Abel Habermann, CTRS        Group Therapy Note    Attendees: 8/15         Pt did not participate in Cognitive Skills Group at 1100am when encouraged by RT due to resting in room. Pt was offered talk time as an alternative to group but declined.        Discipline Responsible: Psychoeducational Specialist      Signature:  Dali Greenfield

## 2020-06-03 NOTE — PROGRESS NOTES
BEHAVIORAL HEALTH FOLLOW-UP NOTE     6/3/2020     Patient was seen and examined in person, Chart reviewed   Patient's case discussed with staff/team    Chief Complaint: Schizoaffective disorder  Interim History:   -The patient appears to be less drowsy today. He continues to present with negative symptoms  -He denies any side effects from Clozapine  Specifically, he denies any constipation or drooling  -The patient will be discharged tomorrow. .    -Appetite:   [x] Normal/Unchanged  [] Increased  [] Decreased      Sleep:       [] Normal/Unchanged  [x] Fair       [] Poor              Energy:    [x] Normal/Unchanged  [] Increased  [] Decreased        Aggression:  [] yes  [x] no    Patient is [x] able  [] unable to CONTRACT FOR SAFETY ON THE UNIT    PAST MEDICAL/PSYCHIATRIC HISTORY:   Past Medical History:   Diagnosis Date    Depression     [facility]        FAMILY/SOCIAL HISTORY:  Family History   Family history unknown: Yes     Social History     Socioeconomic History    Marital status: Single     Spouse name: Not on file    Number of children: Not on file    Years of education: Not on file    Highest education level: Not on file   Occupational History    Not on file   Social Needs    Financial resource strain: Not on file    Food insecurity     Worry: Not on file     Inability: Not on file    Transportation needs     Medical: Not on file     Non-medical: Not on file   Tobacco Use    Smoking status: Current Every Day Smoker     Packs/day: 1.00    Smokeless tobacco: Never Used    Tobacco comment: pt is accepting of nicotine replacement    Substance and Sexual Activity    Alcohol use: No     Alcohol/week: 0.0 standard drinks     Comment: denies    Drug use: No     Comment: Denies drug use.     Sexual activity: Yes     Partners: Female   Lifestyle    Physical activity     Days per week: Not on file     Minutes per session: Not on file    Stress: Not on file   Relationships    Social 97.7 °F (36.5 °C) (Oral)   Resp 14   Ht 6' 5\" (1.956 m)   Wt 165 lb (74.8 kg)   SpO2 99%   BMI 19.57 kg/m²   Gait - steady  Medication side effects(SE): none    Mental Status Examination:    Level of consciousness:  alert  Appearance:  poor grooming and poor hygiene  Behavior/Motor: evasive   Attitude toward examiner:  cooperative  Speech:  poverty of speech   Mood: constricted  Affect:  mood congruent  Thought processes:  linear, goal directed and coherent   Thought content:  Homicidal ideation - none  Suicidal Ideation:  denies suicidal ideation  Delusions:  Denies  Perceptual Disturbance: denies  Cognition:  oriented to person, place, and time   Concentration fair  Insight poor   Judgement poor     ASSESSMENT:   Patient symptoms are:  [] Well controlled  [x] Improving  [] Worsening  [] No change      Diagnosis:   Principal Problem:    Schizoaffective disorder, bipolar type ([facility]  Resolved Problems:    * No resolved hospital problems. *      LABS:    Recent Labs     06/01/20  0615   WBC 4.3   HGB 15.0        No results for input(s): NA, K, CL, CO2, BUN, CREATININE, GLUCOSE in the last 72 hours. No results for input(s): BILITOT, ALKPHOS, AST, ALT in the last 72 hours. Lab Results   Component Value Date    BARBSCNU NEGATIVE 07/20/2017    LABBENZ NEGATIVE 07/20/2017    LABMETH NEGATIVE 07/20/2017    PPXUR NOT REPORTED 07/20/2017     No results found for: TSH, FREET4  No results found for: LITHIUM  No results found for: VALPROATE, CBMZ    RISK ASSESSMENT:   Low risk of suicide. Moderate risk of aggression. Treatment Plan:  Reviewed current Medications with the patient. Denies any side effects. Continue Clozapine as above   Risks, benefits, side effects, drug-to-drug interactions and alternatives to treatment were discussed. The patient  consented to treatment. Encourage patient to attend group and other milieu activities.   Discharge planning discussed with the patient and treatment

## 2020-06-04 PROBLEM — F25.9 SCHIZOAFFECTIVE DISORDER (HCC): Status: ACTIVE | Noted: 2020-06-04

## 2020-06-04 PROCEDURE — 6370000000 HC RX 637 (ALT 250 FOR IP): Performed by: PSYCHIATRY & NEUROLOGY

## 2020-06-04 PROCEDURE — 1240000000 HC EMOTIONAL WELLNESS R&B

## 2020-06-04 PROCEDURE — 99231 SBSQ HOSP IP/OBS SF/LOW 25: CPT | Performed by: PSYCHIATRY & NEUROLOGY

## 2020-06-04 PROCEDURE — 6370000000 HC RX 637 (ALT 250 FOR IP): Performed by: REGISTERED NURSE

## 2020-06-04 RX ORDER — CLOZAPINE 25 MG/1
125 TABLET ORAL DAILY
Qty: 30 TABLET | Refills: 3 | Status: ON HOLD | OUTPATIENT
Start: 2020-06-05 | End: 2020-06-17 | Stop reason: HOSPADM

## 2020-06-04 RX ORDER — TRAZODONE HYDROCHLORIDE 50 MG/1
50 TABLET ORAL NIGHTLY PRN
Qty: 30 TABLET | Refills: 0 | Status: ON HOLD | OUTPATIENT
Start: 2020-06-04 | End: 2020-06-17 | Stop reason: HOSPADM

## 2020-06-04 RX ADMIN — BENZTROPINE MESYLATE 1 MG: 1 TABLET ORAL at 08:46

## 2020-06-04 RX ADMIN — CLOZAPINE 125 MG: 25 TABLET ORAL at 08:46

## 2020-06-04 NOTE — GROUP NOTE
Group Therapy Note    Date: 6/4/2020    Group Start Time: 1430  Group End Time: 4237  Group Topic: Cognitive Skills    BERNARD Luu, CTRS        Group Therapy Note    Attendees: 8/16         Pt did not participate in Cognitive Skills Group at 1430 when encouraged by RT due to resting in room. Pt was offered talk time as an alternative to group but declined.        Discipline Responsible: Psychoeducational Specialist      Signature:  Prateek Franco

## 2020-06-04 NOTE — PLAN OF CARE
Problem: Depressive Behavior With or Without Suicide Precautions:  Goal: Able to verbalize and/or display a decrease in depressive symptoms  Description: Able to verbalize and/or display a decrease in depressive symptoms  6/3/2020 2134 by Princess Nguyễn LPN  Outcome: Ongoing     Problem: Depressive Behavior With or Without Suicide Precautions:  Goal: Ability to disclose and discuss suicidal ideas will improve  Description: Ability to disclose and discuss suicidal ideas will improve  6/3/2020 2134 by Princess Nguyễn LPN  Outcome: Ongoing   Patient denies suicidal ideas at this time. Patient denies homicidal ideas at this time. Patient denies depressive symptoms at this time. Patient has been in room. Patient is free of self harm at this time. Patient agrees to seek out staff if thoughts to harm self arise. Staff will provide support and reassurance as needed. Safety checks maintained every 15 minutes.

## 2020-06-04 NOTE — PROGRESS NOTES
BEHAVIORAL HEALTH FOLLOW-UP NOTE     6/4/2020     Patient was seen and examined in person, Chart reviewed   Patient's case discussed with staff/team    Chief Complaint: Schizoaffective disorder  Interim History:   -Patient is denying any symptoms. Tolerating medications without any problems. He was due for discharge today but could not be transported to accepting group home in time today.  -Will aim for discharge tomorrow. -Appetite:   [x] Normal/Unchanged  [] Increased  [] Decreased      Sleep:       [] Normal/Unchanged  [x] Fair       [] Poor              Energy:    [x] Normal/Unchanged  [] Increased  [] Decreased        Aggression:  [] yes  [x] no    Patient is [x] able  [] unable to CONTRACT FOR SAFETY ON THE UNIT    PAST MEDICAL/PSYCHIATRIC HISTORY:   Past Medical History:   Diagnosis Date    Depression     [facility]        FAMILY/SOCIAL HISTORY:  Family History   Family history unknown: Yes     Social History     Socioeconomic History    Marital status: Single     Spouse name: Not on file    Number of children: Not on file    Years of education: Not on file    Highest education level: Not on file   Occupational History    Not on file   Social Needs    Financial resource strain: Not on file    Food insecurity     Worry: Not on file     Inability: Not on file    Transportation needs     Medical: Not on file     Non-medical: Not on file   Tobacco Use    Smoking status: Current Every Day Smoker     Packs/day: 1.00    Smokeless tobacco: Never Used    Tobacco comment: pt is accepting of nicotine replacement    Substance and Sexual Activity    Alcohol use: No     Alcohol/week: 0.0 standard drinks     Comment: denies    Drug use: No     Comment: Denies drug use.     Sexual activity: Yes     Partners: Female   Lifestyle    Physical activity     Days per week: Not on file     Minutes per session: Not on file    Stress: Not on file   Relationships    Social connections     Talks on phone:

## 2020-06-04 NOTE — DISCHARGE SUMMARY
suicide precaution  Patient was started on Clozapine. It was titrated up to 125mg. Was encouraged to participate in group and other milieu activity  Patient started to feel better with this combination of treatment. Significant progress in the symptoms since admission. Mood is improved  The patient denies AVH or paranoid thoughts  The patient denies any hopelessness or worthlessness  No active SI/HI  Appetite:  [x] Normal  [] Increased  [] Decreased    Sleep:       [x] Normal  [] Fair       [] Poor            Energy:    [x] Normal  [] Increased  [] Decreased     SI [] Present  [x] Absent  HI  []Present  [x] Absent   Aggression:  [] yes  [] no  Patient is [x] able  [] unable to CONTRACT FOR SAFETY   Medication side effects(SE):  [x] None(Psych. Meds.) [] Other      Mental Status Examination on discharge:    Level of consciousness:  within normal limits   Appearance:  well-appearing  Behavior/Motor:  no abnormalities noted  Attitude toward examiner:  attentive and good eye contact  Speech:  spontaneous, normal rate and normal volume   Mood: constricted  Affect:  blunted  Thought processes:  goal directed, coherent and poverty of thought   Thought content:  Suicidal Ideation:  denies suicidal ideation  Delusions:  no evidence of delusions  Perceptual Disturbance:  denies any perceptual disturbance  Cognition:  oriented to person, place, and time   Concentration intact  Memory intact  Insight good   Judgement fair   Fund of Knowledge adequate      ASSESSMENT:  Patient symptoms are:  [x] Well controlled  [x] Improving  [] Worsening  [] No change      Diagnosis:  Principal Problem:    Schizoaffective disorder, bipolar [facility] ([facility]  Active Problems:    Schizoaffective disorder ([facility]  Resolved Problems:    * No resolved hospital problems. *      LABS:    No results for input(s): WBC, HGB, PLT in the last 72 hours. No results for input(s): NA, K, CL, CO2, BUN, CREATININE, GLUCOSE in the last 72 hours.   No results for

## 2020-06-05 VITALS
BODY MASS INDEX: 19.48 KG/M2 | SYSTOLIC BLOOD PRESSURE: 141 MMHG | DIASTOLIC BLOOD PRESSURE: 56 MMHG | HEIGHT: 77 IN | HEART RATE: 87 BPM | TEMPERATURE: 97.3 F | OXYGEN SATURATION: 99 % | RESPIRATION RATE: 14 BRPM | WEIGHT: 165 LBS

## 2020-06-05 LAB — T-SPOT TB TEST: NORMAL

## 2020-06-05 PROCEDURE — 6370000000 HC RX 637 (ALT 250 FOR IP): Performed by: PSYCHIATRY & NEUROLOGY

## 2020-06-05 PROCEDURE — 6370000000 HC RX 637 (ALT 250 FOR IP): Performed by: REGISTERED NURSE

## 2020-06-05 PROCEDURE — 99238 HOSP IP/OBS DSCHRG MGMT 30/<: CPT | Performed by: PSYCHIATRY & NEUROLOGY

## 2020-06-05 RX ADMIN — BENZTROPINE MESYLATE 1 MG: 1 TABLET ORAL at 10:22

## 2020-06-05 RX ADMIN — CLOZAPINE 125 MG: 25 TABLET ORAL at 12:10

## 2020-06-05 NOTE — BH NOTE
1:1 interview was done via Telehealth  By Dr Kirby Barton. Pt states he is ready for discharge and will be going to a group home. States he feels good & can be safe if discharged.
Covid NP swab completed. Pt cooperative during procedure. Charge nurse delivered to lab.
Patient declined COVID swab at this time, will reassess and continue to stress compliance.
Patient declined to participate in 1600 wellness group. 1:1 talk time offered instead.
Patient given tobacco quitline number 17562858087 at this time, refusing to call at this time, states \" I just dont want to quit now\"- patient given information as to the dangers of long term tobacco use. Continue to reinforce the importance of tobacco cessation.
Patient seen by doctor via telehealth.  Joaquin Ramirez, RN
Patient was seen by Dr. Leonidas Seaman via Telehealth today.
Patient was seen by Dr. Sagar Jones via tele psych today.
Patient was seen by GEMA Faye via telepsych.
Patient was seen by Jamal Baca via Telehealth today.
Pt agreed to COVID-19 testing swab. However,  in the process of getting the specimen,  Pt arms flailed his arms as reaction to the discomfort of the testing causing the swab to fall on the floor. Pt refused a repeat swab, despite explanation of risk being on the unit longer than necessary.
RN has reviewed LPN documentation.
RT provided activity packet which includes materials r/t coping skills, self expression, journaling and creative expression. Pt was unable to complete RT assessment at this time. RT will seek out pt to complete assessment during next shift on 5/26/2020.
Writer reviewed LPN documentation.
patient refused to attend wellness group at 1600 after encouragement from staff.   1:1 talk time provided as alternative to group session
patient refused to attend wellness group at 1600 after encouragement from staff.   1:1 talk time provided as alternative to group session and refused
( )  Basic information about quitting (benefits of quitting, techniques in how to quit, available resources  ( ) Referral for counseling faxed to [facility]        (x ) Patient refused counseling  ( ) Patient has not smoked in the last 30 days    Metabolic Screening:    No results found for: LABA1C    No results found for: CHOL  No results found for: TRIG  No results found for: HDL  No components found for: LDLCAL  No results found for: LABVLDL      Body mass index is 19.57 kg/m². BP Readings from Last 2 Encounters:   05/24/20 120/78   05/24/20 130/82           Pt admitted with followings belongings:  Dentures: None  Vision - Corrective Lenses: None  Hearing Aid: None  Jewelry: None  Body Piercings Removed: N/A  Clothing: Socks, Footwear, Undergarments (Comment), Jacket / coat, Pants, Shirt  Were All Patient Medications Collected?: Not Applicable  Other Valuables: Money (Comment)(37 cents lighter,  cigarettes)     Valuables placed in safe in security envelope, number:  \[id number]\. Patient oriented to surroundings and program expectations and copy of patient rights given. Received admission packet: Yes. Consents reviewed, signed: Yes. Patient verbalize understanding of need for curretnadmission. Patient education on precautions of [facility]. Pt admitted to [facility] Unit Room 214 per provider order. Pt changed into hospital attire, nourishment provided. Pt scanned with metal detector. Pt came to ED due to having suicidal ideation with plan to cut himself. Pt also reports command auditory hallucinations to harm self. Pt denies homicidal ideation. Pt transferred to [facility] voluntarily. Upon admission to the unit he denies any suicidal or homicidal ideation, denies any hallucinations. Pt states he just \"had a bad day\", that's why he is here.   Pt reports he is compliant with his medication and he receives [place] injection
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
## 2020-06-05 NOTE — GROUP NOTE
Group Therapy Note    Date: 6/5/2020    Group Start Time: 0900  Group End Time: 0930  Group Topic: Cognitive Skills    BERNARD Vigil, CTRS        Group Therapy Note    Attendees: 7/18         Pt did not participate in Community Meeting/Goals Group at 900am when encouraged by RT due to resting in room. Pt was offered talk time as an alternative to group but declined.       Discipline Responsible: Psychoeducational Specialist      Signature:  Oswaldo Kong

## 2020-06-12 ENCOUNTER — HOSPITAL ENCOUNTER (EMERGENCY)
Age: 30
Discharge: HOME | End: 2020-06-12
Attending: EMERGENCY MEDICINE
Payer: COMMERCIAL

## 2020-06-12 VITALS
SYSTOLIC BLOOD PRESSURE: 127 MMHG | DIASTOLIC BLOOD PRESSURE: 80 MMHG | WEIGHT: 165 LBS | RESPIRATION RATE: 16 BRPM | BODY MASS INDEX: 19.48 KG/M2 | TEMPERATURE: 97.7 F | OXYGEN SATURATION: 97 % | HEART RATE: 86 BPM | HEIGHT: 77 IN

## 2020-06-12 PROCEDURE — 99284 EMERGENCY DEPT VISIT MOD MDM: CPT

## 2020-06-12 ASSESSMENT — ENCOUNTER SYMPTOMS
NAUSEA: 0
ABDOMINAL PAIN: 0
SHORTNESS OF BREATH: 0
VOMITING: 0

## 2020-06-13 ENCOUNTER — HOSPITAL ENCOUNTER (EMERGENCY)
Age: 30
Discharge: PSYCHIATRIC OR SUBSTANCE USE FACILITY | End: 2020-06-13
Attending: EMERGENCY MEDICINE
Payer: COMMERCIAL

## 2020-06-13 ENCOUNTER — HOSPITAL ENCOUNTER (INPATIENT)
Age: 30
LOS: 4 days | Discharge: HOME | DRG: 885 | End: 2020-06-17
Attending: PSYCHIATRY & NEUROLOGY | Admitting: PSYCHIATRY & NEUROLOGY
Payer: COMMERCIAL

## 2020-06-13 VITALS
SYSTOLIC BLOOD PRESSURE: 116 MMHG | RESPIRATION RATE: 16 BRPM | TEMPERATURE: 97.9 F | HEART RATE: 65 BPM | OXYGEN SATURATION: 98 % | DIASTOLIC BLOOD PRESSURE: 75 MMHG

## 2020-06-13 LAB
SARS-COV-2, PCR: NORMAL
SARS-COV-2, RAPID: NORMAL
SARS-COV-2: NOT DETECTED
SOURCE: NORMAL

## 2020-06-13 PROCEDURE — U0003 INFECTIOUS AGENT DETECTION BY NUCLEIC ACID (DNA OR RNA); SEVERE ACUTE RESPIRATORY SYNDROME CORONAVIRUS 2 (SARS-COV-2) (CORONAVIRUS DISEASE [COVID-19]), AMPLIFIED PROBE TECHNIQUE, MAKING USE OF HIGH THROUGHPUT TECHNOLOGIES AS DESCRIBED BY CMS-2020-01-R: HCPCS

## 2020-06-13 PROCEDURE — 1240000000 HC EMOTIONAL WELLNESS R&B

## 2020-06-13 PROCEDURE — 6370000000 HC RX 637 (ALT 250 FOR IP): Performed by: STUDENT IN AN ORGANIZED HEALTH CARE EDUCATION/TRAINING PROGRAM

## 2020-06-13 PROCEDURE — 99285 EMERGENCY DEPT VISIT HI MDM: CPT

## 2020-06-13 RX ORDER — ACETAMINOPHEN 325 MG/1
650 TABLET ORAL EVERY 4 HOURS PRN
Status: DISCONTINUED | OUTPATIENT
Start: 2020-06-13 | End: 2020-06-17 | Stop reason: HOSPADM

## 2020-06-13 RX ORDER — ZIPRASIDONE HYDROCHLORIDE 20 MG/1
20 CAPSULE ORAL ONCE
Status: COMPLETED | OUTPATIENT
Start: 2020-06-13 | End: 2020-06-13

## 2020-06-13 RX ORDER — NICOTINE 21 MG/24HR
1 PATCH, TRANSDERMAL 24 HOURS TRANSDERMAL DAILY
Status: DISCONTINUED | OUTPATIENT
Start: 2020-06-14 | End: 2020-06-17 | Stop reason: HOSPADM

## 2020-06-13 RX ORDER — MAGNESIUM HYDROXIDE/ALUMINUM HYDROXICE/SIMETHICONE 120; 1200; 1200 MG/30ML; MG/30ML; MG/30ML
30 SUSPENSION ORAL EVERY 6 HOURS PRN
Status: DISCONTINUED | OUTPATIENT
Start: 2020-06-13 | End: 2020-06-17 | Stop reason: HOSPADM

## 2020-06-13 RX ORDER — BENZTROPINE MESYLATE 1 MG/ML
2 INJECTION INTRAMUSCULAR; INTRAVENOUS 2 TIMES DAILY PRN
Status: DISCONTINUED | OUTPATIENT
Start: 2020-06-13 | End: 2020-06-17 | Stop reason: HOSPADM

## 2020-06-13 RX ORDER — TRAZODONE HYDROCHLORIDE 50 MG/1
50 TABLET ORAL NIGHTLY PRN
Status: DISCONTINUED | OUTPATIENT
Start: 2020-06-14 | End: 2020-06-15

## 2020-06-13 RX ADMIN — ZIPRASIDONE HYDROCHLORIDE 20 MG: 20 CAPSULE ORAL at 05:38

## 2020-06-13 ASSESSMENT — ENCOUNTER SYMPTOMS
COUGH: 0
ABDOMINAL PAIN: 0
VOMITING: 0
BACK PAIN: 0
SHORTNESS OF BREATH: 0
NAUSEA: 0
RHINORRHEA: 0

## 2020-06-13 ASSESSMENT — SLEEP AND FATIGUE QUESTIONNAIRES
AVERAGE NUMBER OF SLEEP HOURS: 10
DO YOU HAVE DIFFICULTY SLEEPING: NO
DO YOU USE A SLEEP AID: NO

## 2020-06-13 ASSESSMENT — LIFESTYLE VARIABLES: HISTORY_ALCOHOL_USE: NO

## 2020-06-13 ASSESSMENT — PATIENT HEALTH QUESTIONNAIRE - PHQ9: SUM OF ALL RESPONSES TO PHQ QUESTIONS 1-9: 2

## 2020-06-13 ASSESSMENT — PAIN SCALES - GENERAL: PAINLEVEL_OUTOF10: 0

## 2020-06-13 NOTE — ED PROVIDER NOTES
atraumatic. Eyes:      Extraocular Movements: Extraocular movements intact. Conjunctiva/sclera: Conjunctivae normal.   Cardiovascular:      Rate and Rhythm: Normal rate. Heart sounds: No murmur. Pulmonary:      Effort: Pulmonary effort is normal.      Breath sounds: Normal breath sounds. Abdominal:      General: Abdomen is flat. There is no distension. Musculoskeletal: Normal range of motion. General: No swelling or tenderness. Skin:     General: Skin is warm. Capillary Refill: Capillary refill takes less than 2 seconds. Neurological:      General: No focal deficit present. Mental Status: He is alert. Psychiatric:      Comments: Mild slurred speech, somewhat withdrawn, odd affect         DIFFERENTIAL  DIAGNOSIS     PLAN (LABS / IMAGING / EKG):  No orders of the defined types were placed in this encounter. MEDICATIONS ORDERED:  Orders Placed This Encounter   Medications    ziprasidone (GEODON) capsule 20 mg         DIAGNOSTIC RESULTS / EMERGENCY DEPARTMENT COURSE / MDM     LABS:  No results found for this visit on 06/13/20. MDM/EMERGENCY DEPARTMENT COURSE:    29-year-old male recently seen here earlier today for hallucinations, was agreeable to outpatient psych visit on Monday, returning with recurrence of voices. No suicidal ideation. Does not appear to be a threat to himself or others at this time. Requested admission to [facility] or [facility], but given lack of suicidal ideation, do not feel patient warrants inpatient admission at this time. Did give patient Geodon, to assist with his hallucinations. Patient is agreeable with plan. I discussed return precautions with him. PROCEDURES:  None    CONSULTS:  None    CRITICAL CARE:  None    FINAL IMPRESSION      1.  Hallucinations          DISPOSITION / PLAN     DISPOSITION     PATIENT REFERRED TO:  [facility]  [address] [facility] [address] [phone]  Go to   If symptoms

## 2020-06-13 NOTE — ED PROVIDER NOTES
[facility] [address] [facility]     Emergency Department     Faculty Attestation    I performed a history and physical examination of the patient and discussed management with the resident. I reviewed the residents note and agree with the documented findings and plan of care. Any areas of disagreement are noted on the chart. I was personally present for the key portions of any procedures. I have documented in the chart those procedures where I was not present during the key portions. I have reviewed the emergency nurses triage note. I agree with the chief complaint, past medical history, past surgical history, allergies, medications, social and family history as documented unless otherwise noted below. For Physician Assistant/ Nurse Practitioner cases/documentation I have personally evaluated this patient and have completed at least one if not all key elements of the E/M (history, physical exam, and MDM). Additional findings are as noted. This patient was evaluated in the Emergency Department for symptoms described in the history of present illness. He/she was evaluated in the context of the global COVID-19 pandemic, which necessitated consideration that the patient might be at risk for infection with the SARS-CoV-2 virus that causes COVID-19. Institutional protocols and algorithms that pertain to the evaluation of patients at risk for COVID-19 are in a state of rapid change based on information released by regulatory bodies including the CDC and federal and state organizations. These policies and algorithms were followed during the patient's care in the ED. Patient here with auditory hallucinations not command. Denies suicidal homicidal thoughts. Denies drug or alcohol use. Normal pupils normal speech. Will clear to psych.       Critical Care     none    Tamanna Patton MD, Jyoti Mirza  Attending Emergency  Physician             Tamanna Patton MD  06/12/20 9821

## 2020-06-13 NOTE — ED PROVIDER NOTES
Handoff taken on the following patient from prior Attending Physician:    Pt Name: Deidre Butler    PCP:  No primary care provider on file. Attestation    I was available and discussed any additional care issues that arose and coordinated the management plans with the resident(s) caring for the patient during my duty period. Any areas of disagreement with residents documentation of care or procedures are noted on the chart. I was personally present for the key portions of any/all procedures during my duty period. I have documented in the chart those procedures where I was not present during the sales portions.         Junior Dangelo DO  06/13/20 [id number]

## 2020-06-13 NOTE — ED PROVIDER NOTES
[facility]  Emergency Department  Emergency Medicine Resident Sign-out     Care of Yaquelin Lazaro was assumed from  and is being seen for Hallucinations  . The patient's initial evaluation and plan have been discussed with the prior provider who initially evaluated the patient. EMERGENCY DEPARTMENT COURSE / MEDICAL DECISION MAKING:       MEDICATIONS GIVEN:  Orders Placed This Encounter   Medications    ziprasidone (GEODON) capsule 20 mg       LABS / RADIOLOGY:     Labs Reviewed - No data to display    Xr Chest Portable    Result Date: 6/1/2020  EXAMINATION: ONE XRAY VIEW OF THE CHEST 6/1/2020 3:21 pm COMPARISON: October 3, 2017 HISTORY: ORDERING SYSTEM PROVIDED HISTORY: TB screen TECHNOLOGIST PROVIDED HISTORY: TB screen Reason for Exam: tb screen Acuity: Acute Type of Exam: Initial FINDINGS: Cardiomediastinal silhouette, hilar structures and pulmonary vascularity are within normal limits. The lungs are well aerated and clear of focal consolidation, infiltrate or cavitary lesion/mass. No pleural effusion or pneumothorax. Osseous structures are grossly intact. Negative portable chest exam       RECENT VITALS:     Temp: 97.7 °F (36.5 °C),  Pulse: 86, Resp: 16, BP: 127/80, SpO2: 97 %    This patient is a 27 y.o. Male with history of schizophrenia, reports compliance with medications. Presents with altered hallucinations. Initially denying suicidal homicidal ideation. Patient was evaluated by social work, [facility] agreeable to admit patient. OUTSTANDING TASKS / RECOMMENDATIONS:    1. Awaiting transfer to [facility]     FINAL IMPRESSION:     1.  Hallucinations        DISPOSITION:         DISPOSITION:  []  Discharge   []  Transfer -    []  Admission -     []  Against Medical Advice   []  Eloped   FOLLOW-UP: [facility]  [address] [facility] [address] [phone]  Go to   If symptoms worsen    TERRY Alc[address] [facility],[address]  [phone]         DISCHARGE MEDICATIONS: New Prescriptions    No medications on file          Raquel Maya DO  Emergency Medicine Resident  [facility] [place]  06/13/20 6904

## 2020-06-13 NOTE — ED NOTES
Consulted for psychiatric evaluation. Assessed a very pleasant 27year old [place] American male who appeared alert and oriented x4. He was admitted to [facility] from May 24, 2020 - June 5, 2020, discharging to a group home at [facility] in [address]. He resides at the home with 4 other individuals. At time of assessment, patient confirmed auditory hallucinations, denying commanding hallucinations. He denies that the auditory hallucinations are telling him anything. He reported to have came to the ED to \"be proactive to avoid the commanding hallucinations\". Patient was [facility] from [facility] with scheduled appointment at [facility] on June 9, 2020. Patient denied attending the appointment, noting he \"did not realize he had an appointment\". Patient denies suicidal/homicidal ideation. He confirmed to feel safe to return to the community. Social support includes his mother, Kathy Gonzalez, whom he reports to converse with daily. Hx of schizoaffective disorder. Reviewed with [facility] admitting who recommended outpatient mental health treatment and denied need for Rescue at this time.      Jorge LOPEZ, AMY-S, [facility]      JOHN [place], [place]  06/12/20 1170

## 2020-06-13 NOTE — ED PROVIDER NOTES
[facility] ED  Emergency Department  Emergency Medicine Resident Sign-out     Care of Michael Miguel was assumed from Dr. Antonio Miramontes and is being seen for Hallucinations  . The patient's initial evaluation and plan have been discussed with the prior provider who initially evaluated the patient. EMERGENCY DEPARTMENT COURSE / MEDICAL DECISION MAKING:       MEDICATIONS GIVEN:  Orders Placed This Encounter   Medications    ziprasidone (GEODON) capsule 20 mg       LABS / RADIOLOGY:     Labs Reviewed   [id number]       Xr Chest Portable    Result Date: 6/1/2020  EXAMINATION: ONE XRAY VIEW OF THE CHEST 6/1/2020 3:21 pm COMPARISON: October 3, 2017 HISTORY: ORDERING SYSTEM PROVIDED HISTORY: TB screen TECHNOLOGIST PROVIDED HISTORY: TB screen Reason for Exam: tb screen Acuity: Acute Type of Exam: Initial FINDINGS: Cardiomediastinal silhouette, hilar structures and pulmonary vascularity are within normal limits. The lungs are well aerated and clear of focal consolidation, infiltrate or cavitary lesion/mass. No pleural effusion or pneumothorax. Osseous structures are grossly intact. Negative portable chest exam       RECENT VITALS:     Temp: 97.9 °F (36.6 °C),  Pulse: 65, Resp: 16, BP: 116/75, SpO2: 98 %    This patient is a 27 y.o. Male with schizophrenia, hallucinations. Med cleared. [facility]. OUTSTANDING TASKS / RECOMMENDATIONS:    1. Await bed     FINAL IMPRESSION:     1.  Hallucinations        DISPOSITION:         DISPOSITION:  []  Discharge   [x]  Transfer -    []  Admission -     []  Against Medical Advice   []  Eloped   FOLLOW-UP: [facility]  [address] [facility] [address] [phone]  Go to   If symptoms worsen    [address] [facility] [address]         DISCHARGE MEDICATIONS: New Prescriptions    No medications on file          Aissatou Christiansen MD  Emergency Medicine Resident  [facility]        Francis Gibbons MD  Resident  06/13/20 4502

## 2020-06-13 NOTE — ED NOTES
Pt presents to ED w/ c/o hearing voices. Pt was seen in ED earlier tonight for same complain, D/C'd to group home for outpt psych treatment on Monday when office is open. Pt was agreeable to treatment. Pt now states he will wait here until Monday. When informed he can't do that, pt states he can go home. Will continue to assess.      Marlyn Phelps RN  06/13/20 [id number]

## 2020-06-13 NOTE — ED PROVIDER NOTES
[facility]  ED  Emergency Department Encounter  EmergencyMedicine Resident     Pt Jag Schafer  MRN: [id number]  [place] 1990  Date of evaluation: 6/12/20  PCP:  No primary care provider on file. CHIEF COMPLAINT       No chief complaint on file. Auditory hallucinations  HISTORY OF PRESENT ILLNESS  (Location/Symptom, Timing/Onset, Context/Setting, Quality, Duration, Modifying Factors, Severity.)      Amadeo Wan is a 27 y.o. male who presents with concerns for hearing voices. History schizophrenia, depression on trazodone, clozapine which she states he is compliant with. Seen evaluated here last month and was transferred to psychiatric facility for further care. Patient presents complaining of auditory hallucinations. Patient denies command hallucinations. States that the voices here hearing are more of annoyance to him. Patient denies suicidal or homicidal thoughts. Has been admitted previously for inpatient psych for suicidal thoughts but denies this today. States that he came in specifically today to attempt to get placed in inpatient psych before he starts having thoughts of wanting to harm himself. Otherwise patient has no other medical complaint. Patient does report compliance with medications. PAST MEDICAL / SURGICAL / SOCIAL / FAMILY HISTORY      has a past medical history of Depression and Schizophrenia ([facility] [address] has no past surgical history on file.     Social History     Socioeconomic History    Marital status: Single     Spouse name: Not on file    Number of children: Not on file    Years of education: Not on file    Highest education level: Not on file   Occupational History    Not on file   Social Needs    Financial resource strain: Not on file    Food insecurity     Worry: Not on file     Inability: Not on file    Transportation needs     Medical: Not on file     Non-medical: Not on file   Tobacco Use    Smoking status: Current Every Day Smoker     Packs/day: 1.00    Smokeless tobacco: Never Used    Tobacco comment: pt is accepting of nicotine replacement    Substance and Sexual Activity    Alcohol use: No     Alcohol/week: 0.0 standard drinks     Comment: denies    Drug use: No     Comment: Denies drug use.  Sexual activity: Yes     Partners: Female   Lifestyle    Physical activity     Days per week: Not on file     Minutes per session: Not on file    Stress: Not on file   Relationships    Social connections     Talks on phone: Not on file     Gets together: Not on file     Attends Cheondoism service: Not on file     Active member of club or organization: Not on file     Attends meetings of clubs or organizations: Not on file     Relationship status: Not on file    Intimate partner violence     Fear of current or ex partner: Not on file     Emotionally abused: Not on file     Physically abused: Not on file     Forced sexual activity: Not on file   Other Topics Concern    Not on file   Social History Narrative    Not on file       Family History   Family history unknown: Yes       Allergies:  Patient has no known allergies. Home Medications:  Prior to Admission medications    Medication Sig Start Date End Date Taking? Authorizing Provider   traZODone (DESYREL) 50 MG tablet Take 1 tablet by mouth nightly as needed for Sleep 6/4/20   Lisandra Corral MD   cloZAPine (CLOZARIL) 25 MG tablet Take 5 tablets by mouth daily 6/5/20   Lisandra Corral MD       REVIEW OF SYSTEMS    (2-9 systems for level 4, 10 or more for level 5)      Review of Systems   Constitutional: Negative for chills and fever. HENT: Negative. Respiratory: Negative for shortness of breath. Cardiovascular: Negative for chest pain. Gastrointestinal: Negative for abdominal pain, nausea and vomiting. Musculoskeletal: Negative for arthralgias. Psychiatric/Behavioral: Positive for hallucinations. Negative for suicidal ideas.        PHYSICAL EXAM   (up to 7 for level 4, 8 or more for level 5)      INITIAL VITALS:   There were no vitals taken for this visit. Physical Exam  Constitutional:       General: He is not in acute distress. Appearance: He is not diaphoretic. HENT:      Head: Normocephalic and atraumatic. Eyes:      Pupils: Pupils are equal, round, and reactive to light. Neck:      Musculoskeletal: Normal range of motion and neck supple. Trachea: No tracheal deviation. Cardiovascular:      Rate and Rhythm: Normal rate and regular rhythm. Pulmonary:      Effort: Pulmonary effort is normal. No respiratory distress. Abdominal:      General: There is no distension. Palpations: Abdomen is soft. Tenderness: There is no abdominal tenderness. Musculoskeletal: Normal range of motion. General: No deformity. Skin:     General: Skin is warm and dry. Neurological:      General: No focal deficit present. Mental Status: He is alert. Psychiatric:         Attention and Perception: He perceives auditory hallucinations. He does not perceive visual hallucinations. Thought Content: Thought content does not include homicidal or suicidal ideation. Thought content does not include homicidal or suicidal plan. DIFFERENTIAL  DIAGNOSIS     PLAN (LABS / IMAGING / EKG):  No orders of the defined types were placed in this encounter. MEDICATIONS ORDERED:  No orders of the defined types were placed in this encounter. DIAGNOSTIC RESULTS / EMERGENCY DEPARTMENT COURSE / MDM     LABS:  No results found for this visit on 06/12/20. RADIOLOGY:  None    EKG  None    All EKG's are interpreted by the Emergency Department Physician who either signs or Co-signs this chart in the absence of a cardiologist.    EMERGENCY DEPARTMENT COURSE:  Patient is a 44-year-old male history of schizophrenia compliant with medications presenting with auditory hallucinations. Denies suicidal or homicidal ideation.   Otherwise no other

## 2020-06-13 NOTE — ED PROVIDER NOTES
[facility] ED  Emergency Department  Emergency Medicine Resident [address] was assumed from Dr. Manny Saldivar and is being seen for Hallucinations  . The patient's initial evaluation and plan have been discussed with the prior provider who initially evaluated the patient. EMERGENCY DEPARTMENT COURSE / MEDICAL DECISION MAKING:       MEDICATIONS GIVEN:  Orders Placed This Encounter   Medications    ziprasidone (GEODON) capsule 20 mg       LABS / RADIOLOGY:     Labs Reviewed - No data to display    Xr Chest Portable    Result Date: 6/1/2020  EXAMINATION: ONE XRAY VIEW OF THE CHEST 6/1/2020 3:21 pm COMPARISON: October 3, 2017 HISTORY: ORDERING SYSTEM PROVIDED HISTORY: TB screen TECHNOLOGIST PROVIDED HISTORY: TB screen Reason for Exam: tb screen Acuity: Acute Type of Exam: Initial FINDINGS: Cardiomediastinal silhouette, hilar structures and pulmonary vascularity are within normal limits. The lungs are well aerated and clear of focal consolidation, infiltrate or cavitary lesion/mass. No pleural effusion or pneumothorax. Osseous structures are grossly intact. Negative portable chest exam       RECENT VITALS:     Temp: 97.7 °F (36.5 °C),  Pulse: 86, Resp: 16, BP: 127/80, SpO2: 97 %    This patient is a 27 y.o. Male with hx schizophrenia. Worsening hallucinations. No suicidal or homicidal ideation. He was discharged earlier in the day but came back, patient now accepted ot [facility]            OUTSTANDING TASKS / RECOMMENDATIONS:    1. Waiting transfer to [facility]     FINAL IMPRESSION:     1.  Hallucinations        DISPOSITION:         DISPOSITION:  []  Discharge   [x]  Transfer -    []  Admission -     []  Against Medical Advice   []  Eloped   FOLLOW-UP: [facility]  [address] [facility]  [phone]  Go to   If symptoms worsen    [address]  [address] [facility]

## 2020-06-14 LAB
ABSOLUTE EOS #: 0.3 K/UL (ref 0–0.4)
ABSOLUTE IMMATURE GRANULOCYTE: ABNORMAL K/UL (ref 0–0.3)
ABSOLUTE LYMPH #: 1.9 K/UL (ref 1–4.8)
ABSOLUTE MONO #: 0.4 K/UL (ref 0.1–1.3)
ALBUMIN SERPL-MCNC: 3.9 G/DL (ref 3.5–5.2)
ALBUMIN/GLOBULIN RATIO: ABNORMAL (ref 1–2.5)
ALP BLD-CCNC: 54 U/L (ref 40–129)
ALT SERPL-CCNC: 18 U/L (ref 5–41)
ANION GAP SERPL CALCULATED.3IONS-SCNC: 9 MMOL/L (ref 9–17)
AST SERPL-CCNC: 43 U/L
BASOPHILS # BLD: 1 % (ref 0–2)
BASOPHILS ABSOLUTE: 0 K/UL (ref 0–0.2)
BILIRUB SERPL-MCNC: 0.41 MG/DL (ref 0.3–1.2)
BUN BLDV-MCNC: 15 MG/DL (ref 6–20)
BUN/CREAT BLD: ABNORMAL (ref 9–20)
CALCIUM SERPL-MCNC: 9.3 MG/DL (ref 8.6–10.4)
CHLORIDE BLD-SCNC: 104 MMOL/L (ref 98–107)
CHOLESTEROL/HDL RATIO: 2.5
CHOLESTEROL: 129 MG/DL
CO2: 28 MMOL/L (ref 20–31)
CREAT SERPL-MCNC: 0.7 MG/DL (ref 0.7–1.2)
DIFFERENTIAL TYPE: ABNORMAL
EOSINOPHILS RELATIVE PERCENT: 5 % (ref 0–4)
ESTIMATED AVERAGE GLUCOSE: 105 MG/DL
GFR AFRICAN AMERICAN: >60 ML/MIN
GFR NON-AFRICAN AMERICAN: >60 ML/MIN
GFR SERPL CREATININE-BSD FRML MDRD: ABNORMAL ML/MIN/{1.73_M2}
GFR SERPL CREATININE-BSD FRML MDRD: ABNORMAL ML/MIN/{1.73_M2}
GLUCOSE BLD-MCNC: 81 MG/DL (ref 70–99)
HBA1C MFR BLD: 5.3 % (ref 4–6)
HCT VFR BLD CALC: 41.6 % (ref 41–53)
HDLC SERPL-MCNC: 51 MG/DL
HEMOGLOBIN: 14.3 G/DL (ref 13.5–17.5)
IMMATURE GRANULOCYTES: ABNORMAL %
LDL CHOLESTEROL: 72 MG/DL (ref 0–130)
LYMPHOCYTES # BLD: 40 % (ref 24–44)
MCH RBC QN AUTO: 32.3 PG (ref 26–34)
MCHC RBC AUTO-ENTMCNC: 34.3 G/DL (ref 31–37)
MCV RBC AUTO: 94.4 FL (ref 80–100)
MONOCYTES # BLD: 9 % (ref 1–7)
NRBC AUTOMATED: ABNORMAL PER 100 WBC
PDW BLD-RTO: 14.6 % (ref 11.5–14.9)
PLATELET # BLD: 189 K/UL (ref 150–450)
PLATELET ESTIMATE: ABNORMAL
PMV BLD AUTO: 7.9 FL (ref 6–12)
POTASSIUM SERPL-SCNC: 4.4 MMOL/L (ref 3.7–5.3)
RBC # BLD: 4.41 M/UL (ref 4.5–5.9)
RBC # BLD: ABNORMAL 10*6/UL
SEG NEUTROPHILS: 45 % (ref 36–66)
SEGMENTED NEUTROPHILS ABSOLUTE COUNT: 2.2 K/UL (ref 1.3–9.1)
SODIUM BLD-SCNC: 141 MMOL/L (ref 135–144)
THYROXINE, FREE: 1.08 NG/DL (ref 0.93–1.7)
TOTAL PROTEIN: 6.9 G/DL (ref 6.4–8.3)
TRIGL SERPL-MCNC: 31 MG/DL
TSH SERPL DL<=0.05 MIU/L-ACNC: 1.49 MIU/L (ref 0.3–5)
VLDLC SERPL CALC-MCNC: NORMAL MG/DL (ref 1–30)
WBC # BLD: 4.8 K/UL (ref 3.5–11)
WBC # BLD: ABNORMAL 10*3/UL

## 2020-06-14 PROCEDURE — 90792 PSYCH DIAG EVAL W/MED SRVCS: CPT | Performed by: NURSE PRACTITIONER

## 2020-06-14 PROCEDURE — 80053 COMPREHEN METABOLIC PANEL: CPT

## 2020-06-14 PROCEDURE — 36415 COLL VENOUS BLD VENIPUNCTURE: CPT

## 2020-06-14 PROCEDURE — 85025 COMPLETE CBC W/AUTO DIFF WBC: CPT

## 2020-06-14 PROCEDURE — 84443 ASSAY THYROID STIM HORMONE: CPT

## 2020-06-14 PROCEDURE — 1240000000 HC EMOTIONAL WELLNESS R&B

## 2020-06-14 PROCEDURE — 83036 HEMOGLOBIN GLYCOSYLATED A1C: CPT

## 2020-06-14 PROCEDURE — 6370000000 HC RX 637 (ALT 250 FOR IP): Performed by: NURSE PRACTITIONER

## 2020-06-14 PROCEDURE — 80061 LIPID PANEL: CPT

## 2020-06-14 PROCEDURE — 84439 ASSAY OF FREE THYROXINE: CPT

## 2020-06-14 RX ADMIN — CLOZAPINE 125 MG: 100 TABLET ORAL at 16:53

## 2020-06-14 ASSESSMENT — SLEEP AND FATIGUE QUESTIONNAIRES
DO YOU HAVE DIFFICULTY SLEEPING: NO
AVERAGE NUMBER OF SLEEP HOURS: 10
DO YOU USE A SLEEP AID: NO

## 2020-06-14 ASSESSMENT — PAIN SCALES - GENERAL: PAINLEVEL_OUTOF10: 0

## 2020-06-14 ASSESSMENT — LIFESTYLE VARIABLES: HISTORY_ALCOHOL_USE: NO

## 2020-06-14 NOTE — BH NOTE
Patient given tobacco quitline number 97683126468 at this time, refusing to call at this time, states \" I just dont want to quit now\"- patient given information as to the dangers of long term tobacco use. Continue to reinforce the importance of tobacco cessation.
Patient participated in safety group.  Kiya Bautista RN
Psychiatric Admission Note         Phillip Frazier is a 27 y.o. male who was admitted from [facility].  Patient presented to ED with concerns for hearing voices. History schizophrenia, depression on trazodone, clozapine which he states he is compliant. Was recently hospitalized and discharged 6/5/2020. Currently patient complaining of auditory hallucinations. Patient denies command hallucinations. States that the voices here hearing are more of annoyance to him. Patient denies suicidal or homicidal thoughts. Has been admitted previously for inpatient psych for suicidal thoughts but denies this today. States that he came in specifically today to attempt to get placed in inpatient psych before he starts having thoughts of wanting to harm himself. Patient does report compliance with medications but has to have weekly CBC draw to maintain compliance with [facility] program and did not have that prior to appointment and refill of medication. Met with patient who states he became overwhelmed with situation, trying to go to group home. States the voices were \"making fun of me. \"  States that since being in the hospital voices have subsided and is doing better. Does believe that some of the issues with the auditory hallucinations are secondary to external stressors of going to group home. Also this is the reason why he states he did not attend his follow-up appointment with psychiatry on 6/9/2020. Past Psychiatric History   Patient is linked with Sav Blanchard but did not attend appointment 6/9. Ewelina Gunderson Reported history of psychiatric inpatient hospitalizations. Denied history of suicide attempts. History of Substance Abuse     reports ETOH use    Family History of psychiatric disorders    Family history: denied . Medical History   Allergies:  Patient has no known allergies.    Past Medical History:   Diagnosis Date    Depression     Schizophrenia ([facility] [address]
RT ASSESSMENT TREATMENT GOALS    [x]Pt Goal: Pt will identify 1-2 positive coping skills by time of discharge. []Pt Goal: Pt will identify 1-2 positive aspects of self by time of discharge. [x]Pt Goal: Pt will remain on task/topic for 15-30 minutes during group by time of discharge. []Pt Goal: Pt will identify 1-2 aspects of relapse prevention plan by time of discharge. [x]Pt Goal: Pt will join in conversation with peers 1-2 times per group by time of discharge. []Pt Goal: Pt will identify 1-2 new leisure interests by time of discharge. []Pt Goal: Pt will not voice any delusional content by time of discharge.
techniques, changing routine, distraction)                                                           ( )  Basic information about quitting (benefits of quitting, techniques in how to quit, available resources  ( ) Referral for counseling faxed to [facility]                            ( X) Patient refused counseling  ( ) Patient has not smoked in the last 30 days    Metabolic Screening:    No results found for: LABA1C    No results found for: CHOL  No results found for: TRIG  No results found for: HDL  No components found for: LDLCAL  No results found for: LABVLDL      Body mass index is 20.16 kg/m². BP Readings from Last 2 Encounters:   06/13/20 124/79   06/13/20 116/75           Pt admitted with followings belongings:  Dentures: None  Vision - Corrective Lenses: None  Hearing Aid: None  Jewelry: None  Body Piercings Removed: No  Clothing: Footwear, Shirt, Other (Comment)(shorts, x2 socks, belt, tennis shoes)  Were All Patient Medications Collected?: Yes  Other Valuables: Other (Comment)(ID, EBT card, dice, misc, paper)     Valuables placed in safe in security envelope, number: [id number] Patient's home medications were Trazodone, Clozapine. Patient oriented to surroundings and program expectations and copy of patient rights given. Received admission packet. .  Consents reviewed, signed . Patient verbalized understanding. Patient education on precautions. Admit date: 6/13/2020 10:23 PM  Admitted from: [facility]  Pinked/voluntary: Voluntary  Reason for admit: Increased auditory hallucinations to harm self  Allergies: NKA  Medical issues:  Consults:   Labs/EKG:   Guardian:   Hx violence/assault: no  Sleeping/eating: good  Suicide Risk (H, M, L): low  Summary of daily progress: Patient went to [facility] ED with increased Auditory hallucinations telling him to hurt himself. Patient reports conflict at group home with staff and other residents.  Pt stated that he staff does not help him with his

## 2020-06-15 PROCEDURE — 1240000000 HC EMOTIONAL WELLNESS R&B

## 2020-06-15 PROCEDURE — 6370000000 HC RX 637 (ALT 250 FOR IP): Performed by: PSYCHIATRY & NEUROLOGY

## 2020-06-15 PROCEDURE — 99232 SBSQ HOSP IP/OBS MODERATE 35: CPT | Performed by: PSYCHIATRY & NEUROLOGY

## 2020-06-15 PROCEDURE — 6370000000 HC RX 637 (ALT 250 FOR IP): Performed by: NURSE PRACTITIONER

## 2020-06-15 RX ORDER — CLOZAPINE 25 MG/1
25 TABLET ORAL NIGHTLY
Status: COMPLETED | OUTPATIENT
Start: 2020-06-15 | End: 2020-06-15

## 2020-06-15 RX ADMIN — CLOZAPINE 25 MG: 25 TABLET ORAL at 20:41

## 2020-06-15 RX ADMIN — CLOZAPINE 125 MG: 100 TABLET ORAL at 09:01

## 2020-06-15 NOTE — GROUP NOTE
Group Therapy Note    Date: 6/15/2020    Group Start Time: 0900  Group End Time: 4897  Group Topic: Community Meeting    EMIL SantanaS    Pt did not attend 0900 community meeting/ goal setting group d/t resting in room despite staff invitation to attend. 1:1 talk time offered as alternative to group session, pt declined.               Signature:  Jossy Issa

## 2020-06-15 NOTE — PLAN OF CARE
Recent, Poor Remote  Insight and Judgment: No  Insight and Judgment: Poor Judgment, Poor Insight, Unmotivated  Present Suicidal Ideation: No  Present Homicidal Ideation: No    Daily Assessment Last Entry:   Daily Sleep (WDL): Within Defined Limits         Patient Currently in Pain: Denies  Daily Nutrition (WDL): Within Defined Limits    Patient Monitoring:  Frequency of Checks: 4 times per hour, close    Psychiatric Symptoms:   Depression Symptoms  Depression Symptoms: Impaired concentration, Isolative  Anxiety Symptoms  Anxiety Symptoms: Generalized  Tabatha Symptoms  Tabatha Symptoms: Flight of ideas, Poor judgment     Psychosis Symptoms  Delusion Type: Paranoid, Persecutory    Suicide Risk CSSR-S:  1) Within the past month, have you wished you were dead or wished you could go to sleep and not wake up? : No  2) Have you actually had any thoughts of killing yourself? : No  6) Have you ever done anything, started to do anything, or prepared to do anything to end your life?: No  Change in Result no Change in Plan of care no      EDUCATION:   EDUCATION:   Learner Progress Toward Treatment Goals: Reviewed results and recommendations of this team, Reviewed group plan and strategies, Reviewed signs, symptoms and risk of self harm and violent behavior, Reviewed goals and plan of care    Method:small group, individual verbal education    Outcome:verbalized by patient, but needs reinforcement to obtain goals    PATIENT GOALS:  Short term: pt refuses to attend tx planning to develop goals   Long term: pt refuses to attend tx planning to develop goals    PLAN/TREATMENT RECOMMENDATIONS UPDATE: continue with group therapies, increased socialization, continue planning for after discharge goals, continue with medication compliance    SHORT-TERM GOALS UPDATE:   Time frame for Short-Term Goals: 5-7 days    LONG-TERM GOALS UPDATE:   Time frame for Long-Term Goals: 6 months  Members Present in Team Meeting: See [address] [place]

## 2020-06-15 NOTE — PLAN OF CARE
Problem: Altered Mood, Psychotic Behavior:  Goal: Able to verbalize decrease in frequency and intensity of hallucinations  Description: Able to verbalize decrease in frequency and intensity of hallucinations  6/15/2020 0921 by Kindra Santana LPN  Outcome: Ongoing   Patient denies hallucinations at this time, has been observed responding to internal stim.  Support and encouragement provided

## 2020-06-16 PROCEDURE — 99232 SBSQ HOSP IP/OBS MODERATE 35: CPT | Performed by: PSYCHIATRY & NEUROLOGY

## 2020-06-16 PROCEDURE — 1240000000 HC EMOTIONAL WELLNESS R&B

## 2020-06-16 PROCEDURE — 6370000000 HC RX 637 (ALT 250 FOR IP): Performed by: PSYCHIATRY & NEUROLOGY

## 2020-06-16 RX ADMIN — CLOZAPINE 150 MG: 100 TABLET ORAL at 20:39

## 2020-06-16 ASSESSMENT — PAIN SCALES - GENERAL: PAINLEVEL_OUTOF10: 0

## 2020-06-16 NOTE — GROUP NOTE
Group Therapy Note    Date: 6/16/2020    Group Start Time: 0900  Group End Time: 6456  Group Topic: Community Meeting    CALLIE Ojeda    Pt did not attend 0900 community meeting /goal setting group d/t resting in room despite staff invitation to attend. 1:1 talk time offered as alternative to group session, pt declined.           Signature:  Westley Sarmiento

## 2020-06-16 NOTE — GROUP NOTE
Group Therapy Note    Date: 6/16/2020    Group Start Time: 1100  Group End Time: 1130  Group Topic: Healthy Living/Wellness    BERNARD Abdullahi, CTRS    Pt did not attend 1100 wellness/ relaxation group d/t resting in room despite staff invitation to attend. 1:1 talk time offered as alternative to group session, pt declined.              Signature:  La Watson

## 2020-06-16 NOTE — H&P
Lipid panel - fasting     Collection Time: 06/14/20  6:55 AM   Result Value Ref Range     Cholesterol 129 <200 mg/dL     HDL 51 >40 mg/dL     LDL Cholesterol 72 0 - 130 mg/dL     Chol/HDL Ratio 2.5 <5     Triglycerides 31 <150 mg/dL     VLDL NOT REPORTED 1 - 30 mg/dL   CBC auto differential     Collection Time: 06/14/20  6:55 AM   Result Value Ref Range     WBC 4.8 3.5 - 11.0 k/uL     RBC 4.41 (L) 4.5 - 5.9 m/uL     Hemoglobin 14.3 13.5 - 17.5 g/dL     Hematocrit 41.6 41 - 53 %     MCV 94.4 80 - 100 fL     MCH 32.3 26 - 34 pg     MCHC 34.3 31 - 37 g/dL     RDW 14.6 11.5 - 14.9 %     Platelets 255 232 - 986 k/uL     MPV 7.9 6.0 - 12.0 fL     NRBC Automated NOT REPORTED per 100 WBC     Differential Type NOT REPORTED       Seg Neutrophils 45 36 - 66 %     Lymphocytes 40 24 - 44 %     Monocytes 9 (H) 1 - 7 %     Eosinophils % 5 (H) 0 - 4 %     Basophils 1 0 - 2 %     Immature Granulocytes NOT REPORTED 0 %     Segs Absolute 2.20 1.3 - 9.1 k/uL     Absolute Lymph # 1.90 1.0 - 4.8 k/uL     Absolute Mono # 0.40 0.1 - 1.3 k/uL     Absolute Eos # 0.30 0.0 - 0.4 k/uL     Basophils Absolute 0.00 0.0 - 0.2 k/uL     Absolute Immature Granulocyte NOT REPORTED 0.00 - 0.30 k/uL     WBC Morphology NOT REPORTED       RBC Morphology NOT REPORTED       Platelet Estimate NOT REPORTED              SOCIAL HISTORY  Social History   []Expand by Default            Socioeconomic History    Marital status: Single       Spouse name: Not on file    Number of children: Not on file    Years of education: Not on file    Highest education level: Not on file   Occupational History    Not on file   Social Needs    Financial resource strain: Not on file    Food insecurity       Worry: Not on file       Inability: Not on file    Transportation needs       Medical: Not on file       Non-medical: Not on file   Tobacco Use    Smoking status: Current Every Day Smoker       Packs/day: 1.00    Smokeless tobacco: Never Used    Tobacco comment: pt

## 2020-06-16 NOTE — GROUP NOTE
Group Therapy Note    Date: 6/16/2020    Group Start Time: 1430  Group End Time: 4908  Group Topic: Cognitive Skills    BERNARD Mcdonald, CTRS        Group Therapy Note    Attendees: 10/20         Pt did not participate in Cognitive Skills Group at 1430 when encouraged by RT due to pacing calmly in hallways. Pt was offered talk time as an alternative to group but declined.          Discipline Responsible: Psychoeducational Specialist        Signature:  Yonathan Yo

## 2020-06-17 VITALS
SYSTOLIC BLOOD PRESSURE: 116 MMHG | OXYGEN SATURATION: 100 % | TEMPERATURE: 97.2 F | BODY MASS INDEX: 20.07 KG/M2 | WEIGHT: 170 LBS | HEIGHT: 77 IN | DIASTOLIC BLOOD PRESSURE: 58 MMHG | RESPIRATION RATE: 14 BRPM | HEART RATE: 61 BPM

## 2020-06-17 PROCEDURE — 99239 HOSP IP/OBS DSCHRG MGMT >30: CPT | Performed by: PSYCHIATRY & NEUROLOGY

## 2020-06-17 RX ORDER — CLOZAPINE 50 MG/1
150 TABLET ORAL NIGHTLY
Qty: 30 TABLET | Refills: 3 | Status: SHIPPED | OUTPATIENT
Start: 2020-06-17

## 2020-06-17 NOTE — PLAN OF CARE
Problem: Altered Mood, Depressive Behavior:  Goal: Able to verbalize and/or display a decrease in depressive symptoms  Description: Able to verbalize and/or display a decrease in depressive symptoms  6/16/2020 2246 by Gilford Course  Outcome: Ongoing  Note: Denies suicidal ideation at this time. Out in the milieu at times. Constricted, evasive. Denies hallucinations but appears to be responding to internal stimuli at times. Compliant with all prescribed medications during this shift. Safety checks maintained q15min and irregular rounding. Problem: Altered Mood, Depressive Behavior:  Goal: Ability to disclose and discuss suicidal ideas will improve  Description: Ability to disclose and discuss suicidal ideas will improve  Outcome: Ongoing  Note: Pt denies thoughts of self harm and is agreeable to seeking out should thoughts of self harm arise. Safe environment maintained. Q15 minute checks for safety cont per unit policy. Will cont to monitor for safety and provides support and reassurance as needed.

## 2020-06-17 NOTE — GROUP NOTE
Group Therapy Note    Date: 6/17/2020    Group Start Time: 1100  Group End Time: 1130  Group Topic: Cognitive Skills    BERNARD Ulrich, CTRS      Pt did not attend 1100 skills group d/t resting in room despite staff invitation to attend. 1:1 talk time offered as alternative to group session, pt declined.             Signature:  Lisandra Hernandez

## 2020-06-17 NOTE — DISCHARGE SUMMARY
Spiritism service: Not on file     Active member of club or organization: Not on file     Attends meetings of clubs or organizations: Not on file     Relationship status: Not on file    Intimate partner violence     Fear of current or ex partner: Not on file     Emotionally abused: Not on file     Physically abused: Not on file     Forced sexual activity: Not on file   Other Topics Concern    Not on file   Social History Narrative    Not on file       MEDICATIONS:    Current Facility-Administered Medications:     cloZAPine (CLOZARIL) tablet 150 mg, 150 mg, Oral, Nightly, Jerel Oates MD, 150 mg at 06/16/20 2039    acetaminophen (TYLENOL) tablet 650 mg, 650 mg, Oral, Q4H PRN, Jone Crater, APRN - CNP    benztropine mesylate (COGENTIN) injection 2 mg, 2 mg, Intramuscular, BID PRN, Jone Crater, APRN - CNP    magnesium hydroxide (MILK OF MAGNESIA) 400 MG/5ML suspension 30 mL, 30 mL, Oral, Daily PRN, Jone Crater, APRN - CNP    aluminum & magnesium hydroxide-simethicone (MAALOX) 200-200-20 MG/5ML suspension 30 mL, 30 mL, Oral, Q6H PRN, Jone Crater, APRN - CNP    nicotine (NICODERM CQ) 14 MG/24HR 1 patch, 1 patch, Transdermal, Daily, Jone Crater, APRN - CNP, 1 patch at 06/14/20 1405    Examination:  BP (!) 116/58   Pulse 61   Temp 97.2 °F (36.2 °C) (Oral)   Resp 14   Ht 6' 5\" (1.956 m)   Wt 170 lb (77.1 kg)   SpO2 100%   BMI 20.16 kg/m²   Gait - steady    HOSPITAL COURSE[de-identified]  Following admission to the hospital, patient had a complete physical exam and blood work up, which was unremarkable. Patient was monitored closely with suicide precaution  Patient was started on his prior to admission Clozapine. The dose was increased by 25 mg to 150  Was encouraged to participate in group and other milieu activity  Patient started to feel better with this combination of treatment. Significant progress in the symptoms since admission.     Mood is improved  The patient denies AVH or paranoid thoughts  The patient denies any hopelessness or worthlessness  No active SI/HI  Appetite:  [x] Normal  [] Increased  [] Decreased    Sleep:       [x] Normal  [] Fair       [] Poor            Energy:    [x] Normal  [] Increased  [] Decreased     SI [] Present  [x] Absent  HI  []Present  [x] Absent   Aggression:  [] yes  [] no  Patient is [x] able  [] unable to CONTRACT FOR SAFETY   Medication side effects(SE):  [x] None(Psych. Meds.) [] Other      Mental Status Examination on discharge:    Level of consciousness:  within normal limits   Appearance:  well-appearing  Behavior/Motor:  no abnormalities noted  Attitude toward examiner:  attentive and good eye contact  Speech:  spontaneous, normal rate and normal volume   Mood: euthymic  Affect:  mood congruent  Thought processes:  coherent   Thought content:  Suicidal Ideation:  denies suicidal ideation  Delusions:  no evidence of delusions  Perceptual Disturbance:  denies any perceptual disturbance  Cognition:  oriented to person, place, and time   Concentration intact  Memory intact  Insight good   Judgement fair   Fund of Knowledge adequate      ASSESSMENT:  Patient symptoms are:  [x] Well controlled  [x] Improving  [] Worsening  [] No change      Diagnosis:  Principal Problem:    [facility]ffective disorder ([facility]  Resolved Problems:    * No resolved hospital problems. *      LABS:    No results for input(s): WBC, HGB, PLT in the last 72 hours. No results for input(s): NA, K, CL, CO2, BUN, CREATININE, GLUCOSE in the last 72 hours. No results for input(s): BILITOT, ALKPHOS, AST, ALT in the last 72 hours. Lab Results   Component Value Date    BARBSCNU NEGATIVE 07/20/2017    LABBENZ NEGATIVE 07/20/2017    LABMETH NEGATIVE 07/20/2017    PPXUR NOT REPORTED 07/20/2017     Lab Results   Component Value Date    TSH 1.49 06/14/2020     No results found for: LITHIUM  No results found for: VALPROATE, CBMZ    RISK ASSESSMENT AT DISCHARGE: Low risk for suicide and homicide.

## 2020-06-17 NOTE — GROUP NOTE
Group Therapy Note    Date: 6/17/2020    Group Start Time: 0900  Group End Time: 0930  Group Topic: Community Meeting    [place]        Group Therapy Note    Attendees: 9/22       Pt did not participate in Community Meeting/Goals Group at 900am when encouraged by RT due to resting in room. Pt was offered talk time as an alternative to group but declined.       Discipline Responsible: Psychoeducational Specialist      Signature:  Dwight Santos

## 2020-06-17 NOTE — GROUP NOTE
Group Therapy Note    Date: 6/17/2020    Group Start Time: 1600  Group End Time: 1161  Group Topic: Healthy [facility] Psychiatry    Stalin Vallejo        Group Therapy Note    Attendees: 7/19         Patient's Goal:  To promote healthy living and wellness    Notes:      Status After Intervention:  Improved    Participation Level:  Active Listener    Participation Quality: Appropriate      Speech:  normal      Thought Process/Content: Logical      Affective Functioning: Congruent      Mood: normal      Level of consciousness:  Alert      Response to Learning: Able to verbalize current knowledge/experience      Endings: None Reported    Modes of Intervention: Education and Movement      Discipline Responsible: [employer]      Signature:  Stalin Vallejo

## 2020-06-18 NOTE — PROGRESS NOTES
CLINICAL PHARMACY NOTE: MED[address] TO [address] Select Patient?: No  Total # of Prescriptions Filled: 1   The following medications were delivered to the patient:  · Clozapine  ·   Total # of Interventions Completed: 1  Time Spent (min): 30    Additional Documentation:
guarded  Speech:  Normal in tone, volume, rate and rhythm. Mood: constricted  Affect:  blunted  Thought processes:  poverty of thought   Thought content:  Homicidal ideation - none  Suicidal Ideation:  passive  Delusions:  no evidence of delusions  Perceptual Disturbance:  auditory  Cognition:  oriented to person, place, and time   Concentration intact  Insight poor   Judgement poor     ASSESSMENT:   Patient symptoms are:  [] Well controlled  [x] Improving  [] Worsening  [] No change      Diagnosis:   Principal Problem:    Schizoaffective disorder ([facility] [address]  Resolved Problems:    * No resolved hospital problems. *      LABS:    Recent Labs     06/14/20  0655   WBC 4.8   HGB 14.3        Recent Labs     06/14/20  0655      K 4.4      CO2 28   BUN 15   CREATININE 0.70   GLUCOSE 81     Recent Labs     06/14/20  0655   BILITOT 0.41   ALKPHOS 54   AST 43*   ALT 18     Lab Results   Component Value Date    BARBSCNU NEGATIVE 07/20/2017    LABBENZ NEGATIVE 07/20/2017    LABMETH NEGATIVE 07/20/2017    PPXUR NOT REPORTED 07/20/2017     Lab Results   Component Value Date    TSH 1.49 06/14/2020     No results found for: LITHIUM  No results found for: VALPROATE, CBMZ    RISK ASSESSMENT: Moderate risk of suicide and aggression. Treatment Plan:  Reviewed current Medications with the patient. Clozapine continued at 150 mg dailydose   Risks, benefits, side effects, drug-to-drug interactions and alternatives to treatment were discussed. The patient  consented to treatment. Encourage patient to attend group and other milieu activities.   Discharge planning discussed with the patient and treatment team.    PSYCHOTHERAPY/COUNSELING:  [] Therapeutic interview  [x] Supportive  [] CBT  [] Ongoing  [] Other    [x] Patient continues to need, on a daily basis, active treatment furnished directly by or requiring the supervision of inpatient psychiatric personnel      Anticipated Length of stay:3-5 days
team.    PSYCHOTHERAPY/COUNSELING:  [] Therapeutic interview  [x] Supportive  [] CBT  [] Ongoing  [] Other    [x] Patient continues to need, on a daily basis, active treatment furnished directly by or requiring the supervision of inpatient psychiatric personnel      Anticipated Length of stay:3-5 days                                         Yumiko Washington is a 27 y.o. male being evaluated by a Virtual Visit (video visit) encounter to address concerns as mentioned above. A caregiver was present in the room along with the patient. Pursuant to the emergency declaration under the [address] [place] [address], [address] [place] [address] authority and the [employer] Act, this Virtual Visit was conducted with patient's (and/or legal guardian's) consent, to reduce the patient's risk of exposure to COVID-19 and provide necessary medical care. Services were provided through a video synchronous discussion virtually to substitute for in-person visit by provider. Patient is present at [facility]  and I am physically present at my home in [place] [address], [place] on 6/15/2020 at 11:02 AM    An electronic signature was used to authenticate this note. **This report has been created using voice recognition software. It may contain minor errors which are inherent in voice recognition technology. **

## 2020-06-18 NOTE — CARE COORDINATION
Name: Lord Young    : 1990    Discharge Date: 2020    Primary Auth/Cert #: [id number]    Discharge Medications:      Medication List      CHANGE how you take these medications    cloZAPine 50 MG tablet  Commonly known as:  CLOZARIL  Take 3 tablets by mouth nightly  What changed:    · medication strength  · how much to take  · when to take this  Notes to patient: To help clear thoughts        STOP taking these medications    traZODone 50 MG tablet  Commonly known as:  DESYREL           Where to Get Your Medications      These medications were sent to [facility], [address], [address] [facility] [address]    Phone:  [phone]   · cloZAPine 50 MG tablet         Follow Up Appointment: The [address] [place] [address] I20 Team - Mgr. Frank Perez   [phone] ext. [phone] [place] [address]  Phone: [phone]  Fax: [phone]    Go on 2020  The ACT Team nurse Virginie Shelton will call you on  at 2:00pm    Discharge to group home  [facility], [address]    Go on 2020  Please send by [address] [address] transportation directly to group home.

## 2020-06-19 ENCOUNTER — HOSPITAL ENCOUNTER (EMERGENCY)
Age: 30
Discharge: HOME | End: 2020-06-19
Attending: EMERGENCY MEDICINE
Payer: COMMERCIAL

## 2020-06-19 VITALS
TEMPERATURE: 98.8 F | DIASTOLIC BLOOD PRESSURE: 80 MMHG | WEIGHT: 180 LBS | BODY MASS INDEX: 23.86 KG/M2 | RESPIRATION RATE: 12 BRPM | OXYGEN SATURATION: 96 % | HEART RATE: 85 BPM | SYSTOLIC BLOOD PRESSURE: 119 MMHG | HEIGHT: 73 IN

## 2020-06-19 LAB
ABSOLUTE EOS #: 0.24 K/UL (ref 0–0.44)
ABSOLUTE IMMATURE GRANULOCYTE: <0.03 K/UL (ref 0–0.3)
ABSOLUTE LYMPH #: 2.3 K/UL (ref 1.1–3.7)
ABSOLUTE MONO #: 0.57 K/UL (ref 0.1–1.2)
ANION GAP SERPL CALCULATED.3IONS-SCNC: 11 MMOL/L (ref 9–17)
BASOPHILS # BLD: 1 % (ref 0–2)
BASOPHILS ABSOLUTE: 0.04 K/UL (ref 0–0.2)
BUN BLDV-MCNC: 11 MG/DL (ref 6–20)
BUN/CREAT BLD: ABNORMAL (ref 9–20)
CALCIUM SERPL-MCNC: 9.4 MG/DL (ref 8.6–10.4)
CHLORIDE BLD-SCNC: 103 MMOL/L (ref 98–107)
CO2: 27 MMOL/L (ref 20–31)
CREAT SERPL-MCNC: 0.74 MG/DL (ref 0.7–1.2)
DIFFERENTIAL TYPE: ABNORMAL
EOSINOPHILS RELATIVE PERCENT: 4 % (ref 1–4)
GFR AFRICAN AMERICAN: >60 ML/MIN
GFR NON-AFRICAN AMERICAN: >60 ML/MIN
GFR SERPL CREATININE-BSD FRML MDRD: ABNORMAL ML/MIN/{1.73_M2}
GFR SERPL CREATININE-BSD FRML MDRD: ABNORMAL ML/MIN/{1.73_M2}
GLUCOSE BLD-MCNC: 114 MG/DL (ref 70–99)
HCT VFR BLD CALC: 41.5 % (ref 40.7–50.3)
HEMOGLOBIN: 14.6 G/DL (ref 13–17)
IMMATURE GRANULOCYTES: 0 %
LYMPHOCYTES # BLD: 37 % (ref 24–43)
MCH RBC QN AUTO: 31.7 PG (ref 25.2–33.5)
MCHC RBC AUTO-ENTMCNC: 35.2 G/DL (ref 28.4–34.8)
MCV RBC AUTO: 90 FL (ref 82.6–102.9)
MONOCYTES # BLD: 9 % (ref 3–12)
NRBC AUTOMATED: 0 PER 100 WBC
PDW BLD-RTO: 14.3 % (ref 11.8–14.4)
PLATELET # BLD: 231 K/UL (ref 138–453)
PLATELET ESTIMATE: ABNORMAL
PMV BLD AUTO: 9.7 FL (ref 8.1–13.5)
POTASSIUM SERPL-SCNC: 4.1 MMOL/L (ref 3.7–5.3)
RBC # BLD: 4.61 M/UL (ref 4.21–5.77)
RBC # BLD: ABNORMAL 10*6/UL
SEG NEUTROPHILS: 49 % (ref 36–65)
SEGMENTED NEUTROPHILS ABSOLUTE COUNT: 3.11 K/UL (ref 1.5–8.1)
SODIUM BLD-SCNC: 141 MMOL/L (ref 135–144)
WBC # BLD: 6.3 K/UL (ref 3.5–11.3)
WBC # BLD: ABNORMAL 10*3/UL

## 2020-06-19 PROCEDURE — 80048 BASIC METABOLIC PNL TOTAL CA: CPT

## 2020-06-19 PROCEDURE — 99284 EMERGENCY DEPT VISIT MOD MDM: CPT

## 2020-06-19 PROCEDURE — 85025 COMPLETE CBC W/AUTO DIFF WBC: CPT

## 2020-06-19 ASSESSMENT — ENCOUNTER SYMPTOMS
COUGH: 0
NAUSEA: 0
ABDOMINAL PAIN: 0
VOMITING: 0
DIARRHEA: 0
SHORTNESS OF BREATH: 0

## 2020-06-19 NOTE — ED PROVIDER NOTES
[facility]     Emergency Department     Faculty Attestation    I performed a history and physical examination of the patient and discussed management with the resident. I have reviewed and agree with the residents findings including all diagnostic interpretations, and treatment plans as written. Any areas of disagreement are noted on the chart. I was personally present for the key portions of any procedures. I have documented in the chart those procedures where I was not present during the key portions. I have reviewed the emergency nurses triage note. I agree with the chief complaint, past medical history, past surgical history, allergies, medications, social and family history as documented unless otherwise noted below. Documentation of the HPI, Physical Exam and Medical Decision Making performed by scribkaci is based on my personal performance of the HPI, PE and MDM. For Physician Assistant/ Nurse Practitioner cases/documentation I have personally evaluated this patient and have completed at least one if not all key elements of the E/M (history, physical exam, and MDM). Additional findings are as noted. 26 yo M c/o Diffuse weakness, no injury no fever no fall no vomit,   Pt denies cp, or abdominal pain, no urinary retention or incontinence, no suicidal or homicidal ideation   pe gcs 15, emely no cervical tenderness, chest non tender, abdomen non tender, no distension, no rigidity, extremity rom intact, strength in extremity x 4 intact symmetric, no thoracic or lumbar spinal tenderness,     Cbc / bmp stable, vss talkative, ambulatory, will dc home,     EKG Interpretation    Interpreted by me      CRITICAL CARE: There was a high probability of clinically significant/life threatening deterioration in this patient's condition which required my urgent intervention. Total critical care time was 0 minutes.   This excludes any time for separately reportable procedures.        Oseas Hall, DO  06/19/20 Olinda 86, DO  06/19/20 3937

## 2020-06-21 ENCOUNTER — HOSPITAL ENCOUNTER (EMERGENCY)
Age: 30
Discharge: HOME | End: 2020-06-21
Attending: EMERGENCY MEDICINE
Payer: COMMERCIAL

## 2020-06-21 VITALS
SYSTOLIC BLOOD PRESSURE: 123 MMHG | HEART RATE: 92 BPM | TEMPERATURE: 98.8 F | BODY MASS INDEX: 23.75 KG/M2 | RESPIRATION RATE: 17 BRPM | WEIGHT: 180 LBS | DIASTOLIC BLOOD PRESSURE: 83 MMHG | OXYGEN SATURATION: 98 %

## 2020-06-21 PROCEDURE — 6370000000 HC RX 637 (ALT 250 FOR IP): Performed by: STUDENT IN AN ORGANIZED HEALTH CARE EDUCATION/TRAINING PROGRAM

## 2020-06-21 PROCEDURE — 99282 EMERGENCY DEPT VISIT SF MDM: CPT

## 2020-06-21 RX ORDER — CYCLOBENZAPRINE HCL 10 MG
10 TABLET ORAL ONCE
Status: COMPLETED | OUTPATIENT
Start: 2020-06-21 | End: 2020-06-21

## 2020-06-21 RX ORDER — ACETAMINOPHEN 500 MG
1000 TABLET ORAL ONCE
Status: COMPLETED | OUTPATIENT
Start: 2020-06-21 | End: 2020-06-21

## 2020-06-21 RX ORDER — CYCLOBENZAPRINE HCL 5 MG
5 TABLET ORAL 2 TIMES DAILY PRN
Qty: 10 TABLET | Refills: 0 | Status: SHIPPED | OUTPATIENT
Start: 2020-06-21 | End: 2020-07-01

## 2020-06-21 RX ORDER — IBUPROFEN 400 MG/1
400 TABLET ORAL ONCE
Status: COMPLETED | OUTPATIENT
Start: 2020-06-21 | End: 2020-06-21

## 2020-06-21 RX ORDER — LIDOCAINE 4 G/G
1 PATCH TOPICAL DAILY
Status: DISCONTINUED | OUTPATIENT
Start: 2020-06-21 | End: 2020-06-21 | Stop reason: HOSPADM

## 2020-06-21 RX ADMIN — CYCLOBENZAPRINE 10 MG: 10 TABLET, FILM COATED ORAL at 13:05

## 2020-06-21 RX ADMIN — IBUPROFEN 400 MG: 400 TABLET, FILM COATED ORAL at 13:04

## 2020-06-21 RX ADMIN — ACETAMINOPHEN 1000 MG: 500 TABLET ORAL at 13:04

## 2020-06-21 ASSESSMENT — ENCOUNTER SYMPTOMS
BACK PAIN: 1
COUGH: 0
VOMITING: 0
NAUSEA: 0
ABDOMINAL PAIN: 0

## 2020-06-21 ASSESSMENT — PAIN SCALES - GENERAL: PAINLEVEL_OUTOF10: 5

## 2020-06-21 NOTE — ED PROVIDER NOTES
°C)    TempSrc: Oral    SpO2:  98%   Weight:  180 lb (81.6 kg)       Physical Exam  Constitutional:       General: He is not in acute distress. Appearance: He is well-developed. HENT:      Head: Normocephalic and atraumatic. Nose: Nose normal.   Eyes:      Pupils: Pupils are equal, round, and reactive to light. Neck:      Musculoskeletal: Normal range of motion and neck supple. Cardiovascular:      Rate and Rhythm: Normal rate and regular rhythm. Heart sounds: No murmur. Pulmonary:      Effort: Pulmonary effort is normal. No respiratory distress. Breath sounds: No stridor. No wheezing. Abdominal:      General: There is no distension. Palpations: Abdomen is soft. Tenderness: There is no abdominal tenderness. Musculoskeletal: Normal range of motion. General: Tenderness present. Cervical back: He exhibits tenderness. Back:    Skin:     General: Skin is warm and dry. Capillary Refill: Capillary refill takes less than 2 seconds. Findings: No erythema or rash. Neurological:      Mental Status: He is alert and oriented to person, place, and time. Sensory: No sensory deficit. Deep Tendon Reflexes: Reflexes normal.   Psychiatric:         Behavior: Behavior normal.         DIFFERENTIAL  DIAGNOSIS     PLAN (LABS / IMAGING / EKG):  Orders Placed This Encounter   Procedures    Encourage fluids       MEDICATIONS ORDERED:  Orders Placed This Encounter   Medications    acetaminophen (TYLENOL) tablet 1,000 mg    ibuprofen (ADVIL;MOTRIN) tablet 400 mg    cyclobenzaprine (FLEXERIL) tablet 10 mg    cyclobenzaprine (FLEXERIL) 5 MG tablet     Sig: Take 1 tablet by mouth 2 times daily as needed for Muscle spasms     Dispense:  10 tablet     Refill:  0    lidocaine 4 % external patch 1 patch       DDX:   The patient presented with a chief complaint of back pain.   Emergencies that needed to be addressed were:  Osteomyelitis  Discitis  Spinal Epidural abscess  Fracture   Herniation with cord compression  Spinal metastasis  Ruptured AAA  Spinal Hematoma        Initial MDM/Plan: 27 y.o. male who presents with upper back pain, has a history of chronic upper back pain for a year. States he was dropped as a kid and has progressively had back pain since. No new focal neuro deficits on exam, no new traumatic injuries to the back. Will give ibuprofen Tylenol for pain control along with Flexeril. Patient did have paraspinal muscle tenderness on physical exam.  Patient's denies being a IV drug user. No plans for imaging at this time. DIAGNOSTIC RESULTS / EMERGENCYDEPARTMENT COURSE / MDM     LABS:  No results found for this visit on 06/21/20. RADIOLOGY:  No orders to display           EMERGENCY DEPARTMENT COURSE:  ED Course as of Jun 21 1324   Sun Jun 21, 2020   1302 Patient seen and assessed the emergency department no acute respiratory cardiovascular distress. Patient complaining of upper back pain has been chronic in nature, no new traumatic injuries, not an IV drug user. On physical exam no bony tenderness, there is paraspinal tenderness around C5-C6, no focal neuro deficits, neuro exam intact. [PS]      ED Course User Index  [PS] Geno English MD          PROCEDURES:  None    CONSULTS:  None    CRITICAL CARE:  Please see attending note    FINAL IMPRESSION      1.  Upper back pain          DISPOSITION / PLAN     DISPOSITION     discharge     PATIENT REFERRED TO:  [facility] ED  [address] [address] [phone]    As needed, If symptoms worsen    TERRY Alc[address] [facility]  [phone]  Go to         DISCHARGE MEDICATIONS:  New Prescriptions    CYCLOBENZAPRINE (FLEXERIL) 5 MG TABLET    Take 1 tablet by mouth 2 times daily as needed for Muscle spasms       Geno English MD  Emergency Medicine Resident    (Please note that portions of this note were completed with a voice recognition program.Efforts were made to edit the dictations but occasionally words are mis-transcribed.)       Amee Plunkett MD  Resident  06/21/20 Oseas Grewal MD  Resident  06/21/20 2798

## 2020-08-18 ENCOUNTER — OFFICE VISIT (OUTPATIENT)
Dept: FAMILY MEDICINE CLINIC | Age: 30
End: 2020-08-18
Payer: COMMERCIAL

## 2020-08-18 VITALS
DIASTOLIC BLOOD PRESSURE: 72 MMHG | HEIGHT: 73 IN | HEART RATE: 74 BPM | WEIGHT: 163.6 LBS | BODY MASS INDEX: 21.68 KG/M2 | TEMPERATURE: 97.4 F | SYSTOLIC BLOOD PRESSURE: 114 MMHG

## 2020-08-18 PROCEDURE — G8420 CALC BMI NORM PARAMETERS: HCPCS | Performed by: STUDENT IN AN ORGANIZED HEALTH CARE EDUCATION/TRAINING PROGRAM

## 2020-08-18 PROCEDURE — G8427 DOCREV CUR MEDS BY ELIG CLIN: HCPCS | Performed by: STUDENT IN AN ORGANIZED HEALTH CARE EDUCATION/TRAINING PROGRAM

## 2020-08-18 PROCEDURE — 4004F PT TOBACCO SCREEN RCVD TLK: CPT | Performed by: STUDENT IN AN ORGANIZED HEALTH CARE EDUCATION/TRAINING PROGRAM

## 2020-08-18 PROCEDURE — 99203 OFFICE O/P NEW LOW 30 MIN: CPT | Performed by: STUDENT IN AN ORGANIZED HEALTH CARE EDUCATION/TRAINING PROGRAM

## 2020-08-18 RX ORDER — CLOZAPINE 25 MG/1
TABLET ORAL
COMMUNITY
Start: 2020-08-13

## 2020-08-18 RX ORDER — VALBENAZINE 80 MG/1
CAPSULE ORAL
COMMUNITY
Start: 2020-07-20

## 2020-08-18 RX ORDER — LORATADINE 10 MG/1
TABLET ORAL
COMMUNITY
Start: 2020-08-13

## 2020-08-18 RX ORDER — ARIPIPRAZOLE 300 MG
KIT INTRAMUSCULAR
COMMUNITY
Start: 2020-07-13

## 2020-08-18 RX ORDER — TRAZODONE HYDROCHLORIDE 50 MG/1
TABLET ORAL
COMMUNITY
Start: 2020-08-13

## 2020-08-18 ASSESSMENT — ENCOUNTER SYMPTOMS
CHEST TIGHTNESS: 0
ABDOMINAL DISTENTION: 0
DIARRHEA: 0
COLOR CHANGE: 0
SINUS PAIN: 0
VOICE CHANGE: 0
ANAL BLEEDING: 0
SINUS PRESSURE: 0
VOMITING: 0
ABDOMINAL PAIN: 0
SHORTNESS OF BREATH: 0
WHEEZING: 0
COUGH: 0
NAUSEA: 0
SORE THROAT: 0

## 2020-08-18 NOTE — PROGRESS NOTES
Visit Information    Have you changed or started any medications since your last visit including any over-the-counter medicines, vitamins, or herbal medicines? no   Have you stopped taking any of your medications? Is so, why? -  no  Are you having any side effects from any of your medications? - no    Have you seen any other physician or provider since your last visit? yes - Zepf   Have you had any other diagnostic tests since your last visit?  no   Have you been seen in the emergency room and/or had an admission in a hospital since we last saw you?  yes - [address]   Have you had your routine dental cleaning in the past 6 months?  no     Do you have an active MyChart account? If no, what is the barrier?   No: Declined    Patient Care Team:  Keeley Ferro MD as PCP - General (Family Medicine)    Medical History Review  Past Medical, Family, and Social History reviewed and does not contribute to the patient presenting condition    Health Maintenance   Topic Date Due    Varicella vaccine (1 of 2 - 2-dose childhood series) 03/10/1991    Pneumococcal 0-64 years Vaccine (1 of 1 - PPSV23) 03/10/1996    HIV screen  03/10/2005    DTaP/Tdap/Td vaccine (1 - Tdap) 03/10/2009    Annual Wellness Visit (AWV)  06/23/2019    Flu vaccine (1) 09/01/2020    Hepatitis A vaccine  Aged Out    Hepatitis B vaccine  Aged Out    Hib vaccine  Aged Out    Meningococcal (ACWY) vaccine  Aged Out

## 2020-08-18 NOTE — PATIENT INSTRUCTIONS
Thank you for letting us take care of you today. We hope all your questions were addressed. If a question was overlooked or something else comes to mind after you return home, please contact a member of your Care Team listed below. Your Care Team at [place] [address] is Team #4  Neelima Ambrosio MD (Faculty)  Shelly Navarro MD (Resident)  Sarah Beth Wilburn MD (Resident)  Cornelio Huertas MD (Resident)  Marcus Aponte MD (Resident)  TAMARA Awad,ESTRADA Escobar., [facility] office)  Lex Devi, [address] (Clinical Practice Manager)  Amadou Mancuso [facility] (Clinical Pharmacist)       Office phone number: [phone]    If you need to get in right away due to illness, please be advised we have \"Same Day\" appointments available Monday-Friday. Please call us at [phone] option #3 to schedule your \"Same Day\" appointment.

## 2020-08-18 NOTE — PROGRESS NOTES
Father        Objective:    /72 (Site: Right Upper Arm, Position: Sitting, Cuff Size: Medium Adult)   Pulse 74   Temp 97.4 °F (36.3 °C) (Temporal)   Ht 6' 0.99\" (1.854 m)   Wt 163 lb 9.6 oz (74.2 kg)   BMI 21.59 kg/m²    BP Readings from Last 3 Encounters:   08/18/20 114/72   06/21/20 123/83   06/19/20 119/80       Physical Exam  Constitutional:       Appearance: He is well-developed. HENT:      Head: Normocephalic and atraumatic. Eyes:      Pupils: Pupils are equal, round, and reactive to light. Cardiovascular:      Rate and Rhythm: Normal rate and regular rhythm. Heart sounds: Normal heart sounds. No murmur. No friction rub. No gallop. Pulmonary:      Effort: Pulmonary effort is normal. No respiratory distress. Breath sounds: Normal breath sounds. No wheezing or rales. Chest:      Chest wall: No tenderness. Abdominal:      General: Bowel sounds are normal. There is no distension. Palpations: Abdomen is soft. Tenderness: There is no abdominal tenderness. Skin:     General: Skin is warm. Findings: No erythema or rash. Neurological:      Mental Status: He is alert and oriented to person, place, and time.          Lab Results   Component Value Date    WBC 6.3 06/19/2020    HGB 14.6 06/19/2020    HCT 41.5 06/19/2020     06/19/2020    CHOL 129 06/14/2020    TRIG 31 06/14/2020    HDL 51 06/14/2020    ALT 18 06/14/2020    AST 43 (H) 06/14/2020     06/19/2020    K 4.1 06/19/2020     06/19/2020    CREATININE 0.74 06/19/2020    BUN 11 06/19/2020    CO2 27 06/19/2020    TSH 1.49 06/14/2020    LABA1C 5.3 06/14/2020     Lab Results   Component Value Date    CALCIUM 9.4 06/19/2020     Lab Results   Component Value Date    LDLCHOLESTEROL 72 06/14/2020       Assessment and Plan:    1. Schizoaffective disorder, bipolar type ([facility] [address]  -Patient [facility] the [facility]  -Currently on Abilify  -Patient lives at the group home currently  - Schedule regular follow-up appointments    2. Cervical dystonia  -Follows up with neurology, Dr. Jacqueline Soto at [address]      Requested Prescriptions      No prescriptions requested or ordered in this encounter       There are no discontinued medications. Lefty received counseling on the following healthy behaviors: nutrition, exercise and medication adherence    Discussed use,benefit, and side effects of prescribed medications. Barriers to medication compliance addressed. All patient questions answered. Pt voiced understanding. Return in about 1 month (around 9/18/2020). Disclaimer: Some orall of this note was transcribed using voice-recognition software. This may cause typographical errors occasionally. Although all effort is made to fix these errors, please do not hesitate to contact our office if there Tacey Lash concern with the understanding of this note.

## 2020-08-21 NOTE — PROGRESS NOTES
Attending Physician Statement    Wt Readings from Last 3 Encounters:   08/18/20 163 lb 9.6 oz (74.2 kg)   06/21/20 180 lb (81.6 kg)   06/19/20 180 lb (81.6 kg)     Temp Readings from Last 3 Encounters:   08/18/20 97.4 °F (36.3 °C) (Temporal)   06/21/20 98.8 °F (37.1 °C) (Oral)   06/19/20 98.8 °F (37.1 °C)     BP Readings from Last 3 Encounters:   08/18/20 114/72   06/21/20 123/83   06/19/20 119/80     Pulse Readings from Last 3 Encounters:   08/18/20 74   06/21/20 92   06/19/20 85         I have discussed the care of Abdias Forrest, including pertinent history and exam findings with the resident. I have reviewed the key elements of all parts of the encounter with the resident. I agree with the assessment, plan and orders as documented by the resident.   (GE Modifier)

## 2020-09-05 ENCOUNTER — HOSPITAL ENCOUNTER (EMERGENCY)
Age: 30
Discharge: HOME | End: 2020-09-05
Attending: EMERGENCY MEDICINE
Payer: COMMERCIAL

## 2020-09-05 VITALS
TEMPERATURE: 98.7 F | DIASTOLIC BLOOD PRESSURE: 76 MMHG | SYSTOLIC BLOOD PRESSURE: 113 MMHG | HEART RATE: 89 BPM | WEIGHT: 163 LBS | OXYGEN SATURATION: 98 % | RESPIRATION RATE: 12 BRPM | HEIGHT: 72 IN | BODY MASS INDEX: 22.08 KG/M2

## 2020-09-05 PROCEDURE — 99284 EMERGENCY DEPT VISIT MOD MDM: CPT

## 2020-09-05 ASSESSMENT — ENCOUNTER SYMPTOMS
WHEEZING: 0
DIARRHEA: 0
SHORTNESS OF BREATH: 0
VOMITING: 0
COUGH: 0
SORE THROAT: 0
APNEA: 0
NAUSEA: 0
ABDOMINAL PAIN: 0
EYE PAIN: 0
RHINORRHEA: 0

## 2020-09-06 NOTE — ED PROVIDER NOTES
[facility] ED  Emergency Department Encounter  EmergencyMedicineResident     This patient was seen during the COVID-19 crisis. There were limited resources and those resources we did have had to be conserved for the sickest of patients. Pt Name: Kalpana Thao  MRN: [id number]  [place] 1990  Date of evaluation: 9/5/20  PCP: Nicole Lopez MD    [address] [place] [address]       Chief Complaint   Patient presents with    Fatigue     Chronic weakness. Incresed today       HISTORY OF PRESENT ILLNESS  (Location/Symptom, Timing/Onset, Context/Setting, Quality, Duration, Modifying Factors, Severity.)      Kalpana Thao is a 27 y.o. male who presents with chronic weakness (above the waist) that has been worsening. Wants to be put in touch with a neurologist and/or chiropractor. Patient reports that he was dropped as a child and has since had this same pain and weakness. No new symptoms or acute issues. He is requesting assistance with outpatient therapy. The patient denies numbness, tingling, weakness, and bowel or bladder incontinence retention. No saddle anesthesia. No history of IV drug abuse. The patient does not take anticoagulants. No abdominal pain. No dysuria, frequency, urgency, or hematuria. No history of recent back injections, surgery or other instrumentation. No history of malignancy. Patient denies chest pain, shortness of breath, abdominal pain, nausea, vomiting, fever, chills, headache, dizziness, lightheadedness, vision changes, dysuria, hematuria, changes in bowel movements. PAST MEDICAL / SURGICAL /SOCIAL / FAMILY HISTORY      has a past medical history of Depression, Schizophrenia ([address] and Scoliosis. has no past surgical history on file.   None     Social History     Socioeconomic History    Marital status: Single     Spouse name: Not on file    Number of children: Not on file    Years of education: Not on file    Highest education level: Not on file   Occupational History    Not on file   Social Needs    Financial resource strain: Not on file    Food insecurity     Worry: Not on file     Inability: Not on file    Transportation needs     Medical: Not on file     Non-medical: Not on file   Tobacco Use    Smoking status: Current Every Day Smoker     Packs/day: 1.00     Years: 18.00     Pack years: 18.00    Smokeless tobacco: Never Used    Tobacco comment: pt is accepting of nicotine replacement    Substance and Sexual Activity    Alcohol use: Not Currently     Alcohol/week: 0.0 standard drinks     Comment: Beer    Drug use: No     Comment: Denies drug use.  Sexual activity: Yes     Partners: Female   Lifestyle    Physical activity     Days per week: Not on file     Minutes per session: Not on file    Stress: Not on file   Relationships    Social connections     Talks on phone: Not on file     Gets together: Not on file     Attends Tenriism service: Not on file     Active member of club or organization: Not on file     Attends meetings of clubs or organizations: Not on file     Relationship status: Not on file    Intimate partner violence     Fear of current or ex partner: Not on file     Emotionally abused: Not on file     Physically abused: Not on file     Forced sexual activity: Not on file   Other Topics Concern    Not on file   Social History Narrative    Not on file       Family History   Problem Relation Age of Onset    Schizophrenia Mother     No Known Problems Father        Allergies:  Grass extracts [gramineae pollens]    Home Medications:  Prior to Admission medications    Medication Sig Start Date End Date Taking?  Authorizing Provider   ABILIFY MAINTENA 300 MG PRSY prefilled syringe  7/13/20   Historical Provider, MD   cloZAPine (CLOZARIL) 25 MG tablet  8/13/20   Historical Provider, MD   INGREZZA 80 MG CAPS  7/20/20   Historical Provider, MD   loratadine (CLARITIN) 10 MG tablet  8/13/20   Historical Provider, MD   traZODone (DESYREL) 50 MG tablet  8/13/20   Historical Provider, MD   cloZAPine (CLOZARIL) 50 MG tablet Take 3 tablets by mouth nightly 6/17/20   Codie Rose MD       REVIEW OF SYSTEMS    (2-9 systems for level 4, 10 or more forlevel 5)      Review of Systems   Constitutional: Negative for activity change, chills, fatigue and fever. HENT: Negative for congestion, rhinorrhea and sore throat. Eyes: Negative for pain. Respiratory: Negative for apnea, cough, shortness of breath and wheezing. Cardiovascular: Negative for chest pain. Gastrointestinal: Negative for abdominal pain, diarrhea, nausea and vomiting. Genitourinary: Negative for decreased urine volume, difficulty urinating, dysuria and hematuria. Musculoskeletal: Negative for neck pain and neck stiffness. Skin: Negative for rash. Neurological: Positive for weakness (upper body). Negative for dizziness, light-headedness and headaches. PHYSICAL EXAM   (up to 7 for level 4, 8 or more forlevel 5)      ED TRIAGE VITALS BP: 113/76, Temp: 98.1 °F (36.7 °C), Pulse: 89, Resp: 12, SpO2: 98 %    Vitals:    09/05/20 2138 09/05/20 2144   BP:  113/76   Pulse:  89   Resp:  12   Temp: 98.1 °F (36.7 °C) 98.7 °F (37.1 °C)   TempSrc: Oral    SpO2:  98%   Weight:  163 lb (73.9 kg)   Height:  6' (1.829 m)         Physical Exam  Constitutional:       Appearance: He is well-developed. He is not ill-appearing, toxic-appearing or diaphoretic. Comments: Comfortable, not ill or toxic. HENT:      Head: Normocephalic and atraumatic. Right Ear: External ear normal.      Left Ear: External ear normal.      Nose: Nose normal.      Mouth/Throat:      Mouth: Mucous membranes are moist.   Eyes:      General: No scleral icterus. Right eye: No discharge. Left eye: No discharge. Extraocular Movements: Extraocular movements intact. Pupils: Pupils are equal, round, and reactive to light. Neck:      Musculoskeletal: Normal range of motion. Trachea: No tracheal deviation. Cardiovascular:      Rate and Rhythm: Normal rate and regular rhythm. Heart sounds: Normal heart sounds. No murmur. No friction rub. No gallop. Pulmonary:      Effort: Pulmonary effort is normal. No respiratory distress. Breath sounds: Normal breath sounds. No wheezing, rhonchi or rales. Abdominal:      General: Bowel sounds are normal. There is no distension. Palpations: Abdomen is soft. There is no mass. Tenderness: There is no abdominal tenderness. There is no guarding. Musculoskeletal: Normal range of motion. Skin:     General: Skin is warm and dry. Capillary Refill: Capillary refill takes less than 2 seconds. Findings: No rash. Neurological:      Mental Status: He is alert and oriented to person, place, and time. Motor: No abnormal muscle tone. Coordination: Coordination normal.      Comments: Full strength and sensation in both upper and lower extremities. Cranial nerves II to XII intact. Normal gait. Normal coordination. DIFFERENTIAL  DIAGNOSIS     PLAN (LABS / IMAGING / EKG):  No orders of the defined types were placed in this encounter. MEDICATIONS ORDERED:  ED Medication Orders (From admission, onward)    None          DDX: MSK, psych    DIAGNOSTIC RESULTS / EMERGENCY DEPARTMENT COURSE / MDM     IMPRESSION & INITIAL PLAN:  26 y/o male, psychiatric history, presents requesting help with setting up outpatient therapy for chronic upper body weakness. No new medical complaints. He feels well otherwise. .On exam, patient is comfortable, not ill or toxic. Cardiac RRR, no murmurs, rubs, gallops, Lungs are CTA-B, no wheezes, rales, rhonchi, Abd soft, nontender, nondistended. Social work is familiar with this patient and knows that the patient has a full team of medical services to help with his care as an outpatient. At this time, I do not feel he needs any other emergency services.  Medically cleared to go back to his group home. Patient was counseled to follow up with primary care physician and given appropriate emergency department return precautions. Patient was discharged in stable condition. LABS:  No results found for this visit on 09/05/20. RADIOLOGY:  No orders to display       ECG:  None     All EKG's are interpreted by the Emergency Department Physician who either signsor Co-signs this chart in the absence of a cardiologist.    BEDSIDE ULTRASOUND:  None     EMERGENCY DEPARTMENT COURSE:          PROCEDURES:  None     CONSULTS:  None    CRITICAL CARE:  See attending physician note    FINAL IMPRESSION      1. Weakness of both upper extremities          DISPOSITION / PLAN     DISPOSITION Decision To Discharge 09/05/2020 10:02:20 PM      PATIENT REFERRED TO:  [facility]  [address] [facility] [address]  [phone]  Go to   If symptoms worsen    Brett Dove MD  [address]   [address]  [id number]  [phone]    Schedule an appointment as soon as possible for a visit in 2 days  For follow-up of this visit      DISCHARGE MEDICATIONS:  New Prescriptions    No medications on file     Modified Medications    No medications on file        Tim Art MD  Emergency Medicine Resident    (Please note that portions of this note were completed with a voice recognition program.  Efforts were made to edit the dictations but occasionally words are mis-transcribed.)        Tim Art MD  Resident  09/06/20 [id number]

## 2020-09-06 NOTE — ED PROVIDER NOTES
[facility] ED     Emergency Department     Faculty Attestation    I performed a history and physical examination of the patient and discussed management with the resident. I reviewed the residents note and agree with the documented findings and plan of care. Any areas of disagreement are noted on the chart. I was personally present for the key portions of any procedures. I have documented in the chart those procedures where I was not present during the key portions. I have reviewed the emergency nurses triage note. I agree with the chief complaint, past medical history, past surgical history, allergies, medications, social and family history as documented unless otherwise noted below. For Physician Assistant/ Nurse Practitioner cases/documentation I have personally evaluated this patient and have completed at least one if not all key elements of the E/M (history, physical exam, and MDM). Additional findings are as noted. This patient was evaluated in the Emergency Department for symptoms described in the history of present illness. He/she was evaluated in the context of the global COVID-19 pandemic, which necessitated consideration that the patient might be at risk for infection with the SARS-CoV-2 virus that causes COVID-19. Institutional protocols and algorithms that pertain to the evaluation of patients at risk for COVID-19 are in a state of rapid change based on information released by regulatory bodies including the CDC and federal and state organizations. These policies and algorithms were followed during the patient's care in the ED. Patient here with no specific complaints other than he states he wants a chiropractic referral.  Explained to patient that we do not have chiropractors on-call nor on the medical staff to whom we can refer. Recommended that perhaps he check with his PCP. No other complaints no other concerns. Normal gait.   Will discharge home      Critical Care

## 2020-09-15 ENCOUNTER — OFFICE VISIT (OUTPATIENT)
Dept: FAMILY MEDICINE CLINIC | Age: 30
End: 2020-09-15
Payer: COMMERCIAL

## 2020-09-15 VITALS
WEIGHT: 161 LBS | SYSTOLIC BLOOD PRESSURE: 114 MMHG | DIASTOLIC BLOOD PRESSURE: 73 MMHG | BODY MASS INDEX: 21.84 KG/M2 | HEART RATE: 86 BPM | TEMPERATURE: 97.1 F

## 2020-09-15 PROBLEM — R00.1 BRADYCARDIA, UNSPECIFIED: Status: ACTIVE | Noted: 2017-06-28

## 2020-09-15 PROBLEM — F60.3 BORDERLINE PERSONALITY DISORDER (HCC): Status: ACTIVE | Noted: 2017-06-28

## 2020-09-15 PROCEDURE — G8427 DOCREV CUR MEDS BY ELIG CLIN: HCPCS | Performed by: STUDENT IN AN ORGANIZED HEALTH CARE EDUCATION/TRAINING PROGRAM

## 2020-09-15 PROCEDURE — 4004F PT TOBACCO SCREEN RCVD TLK: CPT | Performed by: STUDENT IN AN ORGANIZED HEALTH CARE EDUCATION/TRAINING PROGRAM

## 2020-09-15 PROCEDURE — G8420 CALC BMI NORM PARAMETERS: HCPCS | Performed by: STUDENT IN AN ORGANIZED HEALTH CARE EDUCATION/TRAINING PROGRAM

## 2020-09-15 PROCEDURE — 99213 OFFICE O/P EST LOW 20 MIN: CPT | Performed by: STUDENT IN AN ORGANIZED HEALTH CARE EDUCATION/TRAINING PROGRAM

## 2020-09-15 RX ORDER — CLONAZEPAM 0.5 MG/1
0.5 TABLET ORAL NIGHTLY
COMMUNITY
Start: 2020-07-27

## 2020-09-15 ASSESSMENT — ENCOUNTER SYMPTOMS
SINUS PAIN: 0
SHORTNESS OF BREATH: 0
VOICE CHANGE: 0
COLOR CHANGE: 0
VOMITING: 0
NAUSEA: 0
ABDOMINAL DISTENTION: 0
ABDOMINAL PAIN: 0
SINUS PRESSURE: 0
COUGH: 0
WHEEZING: 0

## 2020-09-15 NOTE — PROGRESS NOTES
Subjective:    Renee West is a 27 y.o. male with  has a past medical history of Depression, Schizophrenia ([address] and Scoliosis. Presented to the office today for:  Chief Complaint   Patient presents with    Other     f/u with case  manager from [address] [place] [address]. Pt states he is doing well       HPI    Pt is here for a follow up from his last visit. Pt lives at a group home and follow up with neurosurgery outside. Patient was told to bring his medical records during last visit but patient did not bring any today. No issues or complaints today. Mother contact information [phone]. We will follow-up with mother in terms of patient's medical history. Review of Systems   Constitutional: Negative for fatigue, fever and unexpected weight change. HENT: Negative for sinus pressure, sinus pain and voice change. Eyes: Negative for visual disturbance. Respiratory: Negative for cough, shortness of breath and wheezing. Cardiovascular: Negative for chest pain, palpitations and leg swelling. Gastrointestinal: Negative for abdominal distention, abdominal pain, nausea and vomiting. Endocrine: Negative for polydipsia, polyphagia and polyuria. Genitourinary: Negative for difficulty urinating, flank pain and frequency. Skin: Negative for color change, rash and wound. Neurological: Negative for dizziness, light-headedness, numbness and headaches. Psychiatric/Behavioral: Negative for confusion and decreased concentration. The patient is not nervous/anxious. The patient has a   Family History   Problem Relation Age of Onset    Schizophrenia Mother     No Known Problems Father        Objective:    /73   Pulse 86   Temp 97.1 °F (36.2 °C)   Wt 161 lb (73 kg)   BMI 21.84 kg/m²    BP Readings from Last 3 Encounters:   09/15/20 114/73   09/05/20 113/76   08/18/20 114/72       Physical Exam  Constitutional:       Appearance: He is well-developed.    HENT:      Head: Normocephalic and atraumatic. Eyes:      Pupils: Pupils are equal, round, and reactive to light. Cardiovascular:      Rate and Rhythm: Normal rate and regular rhythm. Heart sounds: Normal heart sounds. No murmur. No friction rub. No gallop. Pulmonary:      Effort: Pulmonary effort is normal. No respiratory distress. Breath sounds: Normal breath sounds. No wheezing or rales. Chest:      Chest wall: No tenderness. Abdominal:      General: Bowel sounds are normal. There is no distension. Palpations: Abdomen is soft. Tenderness: There is no abdominal tenderness. Skin:     General: Skin is warm. Findings: No erythema or rash. Neurological:      Mental Status: He is alert and oriented to person, place, and time. Lab Results   Component Value Date    WBC 6.3 06/19/2020    HGB 14.6 06/19/2020    HCT 41.5 06/19/2020     06/19/2020    CHOL 129 06/14/2020    TRIG 31 06/14/2020    HDL 51 06/14/2020    ALT 18 06/14/2020    AST 43 (H) 06/14/2020     06/19/2020    K 4.1 06/19/2020     06/19/2020    CREATININE 0.74 06/19/2020    BUN 11 06/19/2020    CO2 27 06/19/2020    TSH 1.49 06/14/2020    LABA1C 5.3 06/14/2020     Lab Results   Component Value Date    CALCIUM 9.4 06/19/2020     Lab Results   Component Value Date    LDLCHOLESTEROL 72 06/14/2020       Assessment and Plan:    1. Cervical dystocia  -Follows up with neurology, Dr. Carmen Byrne at [email or web address]    2. Schizoaffective disorder, bipolar type [facility])  -Patient follows up at the [facility]  -Currently on Abilify, clozapine, clonazepam, ingrezza  -Patient lives at the group home currently  - Schedule regular follow-up appointments    3. Healthcare maintenance  -Patient denied any vaccinations today  -We will revisit during next visit          Requested Prescriptions      No prescriptions requested or ordered in this encounter       There are no discontinued medications.     Lefty received counseling on the following

## 2020-09-15 NOTE — PROGRESS NOTES
Visit Information    Have you changed or started any medications since your last visit including any over-the-counter medicines, vitamins, or herbal medicines? no   Have you stopped taking any of your medications? Is so, why? -  no  Are you having any side effects from any of your medications? - no    Have you seen any other physician or provider since your last visit?  no   Have you had any other diagnostic tests since your last visit?  no   Have you been seen in the emergency room and/or had an admission in a hospital since we last saw you?  no   Have you had your routine dental cleaning in the past 6 months?  no     Do you have an active MyChart account? If no, what is the barrier?   No:     Patient Care Team:  Brett Dove MD as PCP - General (Family Medicine)    Medical History Review  Past Medical, Family, and Social History reviewed and does not contribute to the patient presenting condition    Health Maintenance   Topic Date Due    Varicella vaccine (1 of 2 - 2-dose childhood series) 03/10/1991    Pneumococcal 0-64 years Vaccine (1 of 1 - PPSV23) 03/10/1996    HIV screen  03/10/2005    DTaP/Tdap/Td vaccine (1 - Tdap) 03/10/2009    Annual Wellness Visit (AWV)  06/23/2019    Flu vaccine (1) 09/01/2020    Hepatitis A vaccine  Aged Out    Hepatitis B vaccine  Aged Out    Hib vaccine  Aged Out    Meningococcal (ACWY) vaccine  Aged Out

## 2020-09-15 NOTE — PROGRESS NOTES
Attending Physician Statement  I have discussed the care of [address], including pertinent history and exam findings,  with the resident. I have reviewed the key elements of all parts of the encounter with the resident. I agree with the assessment, plan and orders as documented by the resident.   (GE Modifier)    Rad Abreu

## 2020-10-23 NOTE — ED NOTES
Notified patient to follow up with [facility]. Patient agreeable. He confirmed compliance with psychotropic medications as his group home providers give him the medications. Notified IDT.  Discharge plan is home with outpatient mental health treatment    [facility] [address], [facility] [place], [place]  06/12/20 5337 home

## 2020-11-16 ENCOUNTER — OFFICE VISIT (OUTPATIENT)
Dept: FAMILY MEDICINE CLINIC | Age: 30
End: 2020-11-16
Payer: COMMERCIAL

## 2020-11-16 VITALS
HEIGHT: 77 IN | DIASTOLIC BLOOD PRESSURE: 74 MMHG | RESPIRATION RATE: 18 BRPM | BODY MASS INDEX: 20.66 KG/M2 | TEMPERATURE: 97.9 F | HEART RATE: 89 BPM | WEIGHT: 175 LBS | OXYGEN SATURATION: 98 % | SYSTOLIC BLOOD PRESSURE: 116 MMHG

## 2020-11-16 PROCEDURE — G0438 PPPS, INITIAL VISIT: HCPCS | Performed by: FAMILY MEDICINE

## 2020-11-16 PROCEDURE — G8484 FLU IMMUNIZE NO ADMIN: HCPCS | Performed by: FAMILY MEDICINE

## 2020-11-16 ASSESSMENT — PATIENT HEALTH QUESTIONNAIRE - PHQ9
2. FEELING DOWN, DEPRESSED OR HOPELESS: 0
SUM OF ALL RESPONSES TO PHQ QUESTIONS 1-9: 0
SUM OF ALL RESPONSES TO PHQ9 QUESTIONS 1 & 2: 0
1. LITTLE INTEREST OR PLEASURE IN DOING THINGS: 0

## 2020-11-16 ASSESSMENT — LIFESTYLE VARIABLES
HOW OFTEN DURING THE LAST YEAR HAVE YOU BEEN UNABLE TO REMEMBER WHAT HAPPENED THE NIGHT BEFORE BECAUSE YOU HAD BEEN DRINKING: 0
HOW OFTEN DURING THE LAST YEAR HAVE YOU FAILED TO DO WHAT WAS NORMALLY EXPECTED FROM YOU BECAUSE OF DRINKING: 0
HAS A RELATIVE, FRIEND, DOCTOR, OR ANOTHER HEALTH PROFESSIONAL EXPRESSED CONCERN ABOUT YOUR DRINKING OR SUGGESTED YOU CUT DOWN: 0
HOW MANY STANDARD DRINKS CONTAINING ALCOHOL DO YOU HAVE ON A TYPICAL DAY: 0
HOW OFTEN DO YOU HAVE A DRINK CONTAINING ALCOHOL: 1
HAVE YOU OR SOMEONE ELSE BEEN INJURED AS A RESULT OF YOUR DRINKING: 0
AUDIT-C TOTAL SCORE: 3
HOW OFTEN DURING THE LAST YEAR HAVE YOU FOUND THAT YOU WERE NOT ABLE TO STOP DRINKING ONCE YOU HAD STARTED: 0
HOW OFTEN DURING THE LAST YEAR HAVE YOU NEEDED AN ALCOHOLIC DRINK FIRST THING IN THE MORNING TO GET YOURSELF GOING AFTER A NIGHT OF HEAVY DRINKING: 4
HOW OFTEN DURING THE LAST YEAR HAVE YOU HAD A FEELING OF GUILT OR REMORSE AFTER DRINKING: 0
HOW OFTEN DO YOU HAVE SIX OR MORE DRINKS ON ONE OCCASION: 2
AUDIT TOTAL SCORE: 7

## 2020-11-16 NOTE — PROGRESS NOTES
Medicare Annual Wellness Visit  Name: Eugenia Tipton Date: 2020   MRN: [id number] Sex: Male   Age: 27 y.o. Ethnicity: Non-/Non    : 1990 Race: [address] is here for [employer]    Screenings for behavioral, psychosocial and functional/safety risks, and cognitive dysfunction are all negative except as indicated below. These results, as well as other patient data from the [address] form, are documented in Flowsheets linked to this Encounter. Allergies   Allergen Reactions    Grass Extracts [Gramineae Pollens]        Prior to Visit Medications    Medication Sig Taking? Authorizing Provider   clonazePAM (KLONOPIN) 0.5 MG tablet Take 0.5 mg by mouth nightly. Historical Provider, MD   ABILIFY MAINTENA 300 MG PRSY prefilled syringe   Historical Provider, MD   cloZAPine (CLOZARIL) 25 MG tablet   Historical Provider, MD   INGREZZA 80 MG CAPS   Historical Provider, MD   loratadine (CLARITIN) 10 MG tablet   Historical Provider, MD   traZODone (DESYREL) 50 MG tablet   Historical Provider, MD   cloZAPine (CLOZARIL) 50 MG tablet Take 3 tablets by mouth nightly  Pamela Roman MD       Past Medical History:   Diagnosis Date    Depression     Schizophrenia ([facility]     Scoliosis        No past surgical history on file.     Family History   Problem Relation Age of Onset    Schizophrenia Mother     No Known Problems Father        CareTeam (Including outside providers/suppliers regularly involved in providing care):   Patient Care Team:  Yue Rod MD as PCP - General (Family Medicine)    Wt Readings from Last 3 Encounters:   20 175 lb (79.4 kg)   09/15/20 161 lb (73 kg)   20 163 lb (73.9 kg)     Vitals:    20 1339   BP: 116/74   Site: Right Upper Arm   Position: Sitting   Cuff Size: Medium Adult   Pulse: 89   Resp: 18   Temp: 97.9 °F (36.6 °C)   TempSrc: Temporal   SpO2: 98%   Weight: 175 lb (79.4 kg)   Height: 6' 5\" (1.956 m) Body mass index is 20.75 kg/m². Based upon direct observation of the patient, evaluation of cognition reveals recent and remote memory intact. Patient's complete Health Risk Assessment and screening values have been reviewed and are found in Flowsheets. The following problems were reviewed today and where indicated follow up appointments were made and/or referrals ordered. Positive Risk Factor Screenings with Interventions:     Substance History:  Social History     Tobacco History     Smoking Status  Current Every Day Smoker Smoking Frequency  1 pack/day for 18 years (18 pk yrs)    Smokeless Tobacco Use  Never Used    Tobacco Comment  pt is accepting of nicotine replacement           Alcohol History     Alcohol Use Status  Not Currently Drinks/Week  0 Standard drinks or equivalent per week Amount  0.0 standard drinks of alcohol/wk Comment  Beer          Drug Use     Drug Use Status  No Comment  Denies drug use. Sexual Activity     Sexually Active  Yes Partners  Female               Alcohol Screening: Audit-C Score: 3  Total Score: 7    A score of 8 or more is associated with harmful or hazardous drinking. A score of 13 or more in women, and 15 or more in men, is likely to indicate alcohol dependence. Substance Abuse Interventions:  · Tobacco abuse:  patient is not ready to work toward tobacco cessation at this time    General Health and ACP:  General  In general, how would you say your health is?: (!) Poor(skeleton is weak got dropped out of bed when he was little, has been on disability for 6 years)  In the past 7 days, have you experienced any of the following?  New or Increased Pain, New or Increased Fatigue, Loneliness, Social Isolation, Stress or Anger?: (!) New or Increased Pain, None of These  Do you get the social and emotional support that you need?: Yes  Do you have a Living Will?: (!) No(copy given to patient)  Advance Directives     Power of  Living Will ACP-Advance Directive ACP-Power of     Not on File Not on File Filed [address] Risk Interventions:  · No Living Will: Patient declines ACP discussion/assistance    Health Habits/Nutrition:  Health Habits/Nutrition  Do you exercise for at least 20 minutes 2-3 times per week?: Yes  Have you lost any weight without trying in the past 3 months?: No  Do you eat fewer than 2 meals per day?: No  Have you seen a dentist within the past year?: (!) No(list of dentisit given to patient)  Body mass index: 20.75  Health Habits/Nutrition Interventions:  · Dental exam overdue:  dental referral provided    Safety:  Safety  Do you have working smoke detectors?: Yes  Have all throw rugs been removed or fastened?: Yes  Do you have non-slip mats or surfaces in all bathtubs/showers?: Yes  Do all of your stairways have a railing or banister?: Yes  Are your doorways, halls and stairs free of clutter?: Yes  Do you always fasten your seatbelt when you are in a car?: (!) No  Safety Interventions:  · Patient declines any further evaluation/treatment for this issue    ADL:  ADLs  In the past 7 days, did you need help from others to perform any of the following everyday activities? Eating, dressing, grooming, bathing, toileting, or walking/balance?: None  In the past 7 days, did you need help from others to take care of any of the following? Laundry, housekeeping, banking/finances, shopping, telephone use, food preparation, transportation, or taking medications?: (!) Housekeeping, Transportation, Taking Medications(group home lady that is assigned to his case helps him)  ADL Interventions:  · Patient declines any further evaluation/treatment for this issue    Personalized Preventive Plan   Current Health Maintenance Status    There is no immunization history on file for this patient.      Health Maintenance   Topic Date Due    HIV screen  03/10/2005   Fanny Annual Wellness Visit (AWV)  06/23/2019    Flu vaccine (1) 12/15/2020 (Originally 2020)    Pneumococcal 0-64 years Vaccine (1 of 1 - PPSV23) 03/15/2021 (Originally 3/10/1996)    DTaP/Tdap/Td vaccine (1 - Tdap) 09/15/2021 (Originally 3/10/2009)    Hepatitis A vaccine  Aged Out    Hepatitis B vaccine  Aged Out    Hib vaccine  Aged Out    Meningococcal (ACWY) vaccine  Aged Out    Varicella vaccine  Discontinued     Recommendations for [place] Due: see orders and patient instructions/AVS.  . Recommended screening schedule for the next 5-10 years is provided to the patient in written form: see Patient Instructions/AVS.    Domingo DAN LPN, , performed the documented evaluation under the direct supervision of the attending physician. Medicare Annual Wellness Visit  Name: Roxie Galicia Date: 2020   MRN: [id number] Sex: Male   Age: 27 y.o. Ethnicity: Non-/Non    : 1990 Race: [address] is here for [employer]    Screenings for behavioral, psychosocial and functional/safety risks, and cognitive dysfunction are all negative except as indicated below. These results, as well as other patient data from the [address] form, are documented in Flowsheets linked to this Encounter. Allergies   Allergen Reactions    Grass Extracts [Gramineae Pollens]        Prior to Visit Medications    Medication Sig Taking? Authorizing Provider   clonazePAM (KLONOPIN) 0.5 MG tablet Take 0.5 mg by mouth nightly.   Historical Provider, MD   ABILIFY MAINTENA 300 MG PRSY prefilled syringe   Historical Provider, MD   cloZAPine (CLOZARIL) 25 MG tablet   Historical Provider, MD   INGREZZA 80 MG CAPS   Historical Provider, MD   loratadine (CLARITIN) 10 MG tablet   Historical Provider, MD   traZODone (DESYREL) 50 MG tablet   Historical Provider, MD   cloZAPine (CLOZARIL) 50 MG tablet Take 3 tablets by mouth nightly  Wale Chahal MD       Past Medical History:   Diagnosis Date    Depression     Schizophrenia ([facility] [address]     Scoliosis        No past surgical history on file. Family History   Problem Relation Age of Onset    Schizophrenia Mother     No Known Problems Father        CareTeam (Including outside providers/suppliers regularly involved in providing care):   Patient Care Team:  Elvin Mai MD as PCP - General (Family Medicine)    Wt Readings from Last 3 Encounters:   11/16/20 175 lb (79.4 kg)   09/15/20 161 lb (73 kg)   09/05/20 163 lb (73.9 kg)     Vitals:    11/16/20 1339   BP: 116/74   Site: Right Upper Arm   Position: Sitting   Cuff Size: Medium Adult   Pulse: 89   Resp: 18   Temp: 97.9 °F (36.6 °C)   TempSrc: Temporal   SpO2: 98%   Weight: 175 lb (79.4 kg)   Height: 6' 5\" (1.956 m)     Body mass index is 20.75 kg/m². Based upon direct observation of the patient, evaluation of cognition reveals recent and remote memory intact. Patient's complete Health Risk Assessment and screening values have been reviewed and are found in Flowsheets. The following problems were reviewed today and where indicated follow up appointments were made and/or referrals ordered. Positive Risk Factor Screenings with Interventions:     Substance History:  Social History     Tobacco History     Smoking Status  Current Every Day Smoker Smoking Frequency  1 pack/day for 18 years (18 pk yrs)    Smokeless Tobacco Use  Never Used    Tobacco Comment  pt is accepting of nicotine replacement           Alcohol History     Alcohol Use Status  Not Currently Drinks/Week  0 Standard drinks or equivalent per week Amount  0.0 standard drinks of alcohol/wk Comment  Beer          Drug Use     Drug Use Status  No Comment  Denies drug use. Sexual Activity     Sexually Active  Yes Partners  Female               Alcohol Screening: Audit-C Score: 3  Total Score: 7    A score of 8 or more is associated with harmful or hazardous drinking.  A score of 13 or more in women, and 15 or more in men, is likely to indicate alcohol dependence. Substance Abuse Interventions:  · Tobacco abuse:  patient is not ready to work toward tobacco cessation at this time    General Health and ACP:  General  In general, how would you say your health is?: (!) Poor(skeleton is weak got dropped out of bed when he was little, has been on disability for 6 years)  In the past 7 days, have you experienced any of the following? New or Increased Pain, New or Increased Fatigue, Loneliness, Social Isolation, Stress or Anger?: (!) New or Increased Pain, None of These  Do you get the social and emotional support that you need?: Yes  Do you have a Living Will?: (!) No(copy given to patient)  Advance Directives     Power of [address] [place] [address] Will ACP-Advance Directive ACP-Power of     Not on File Not on [address] Risk Interventions:  · No Living Will: Advance Care Planning addressed with patient today    Health Habits/Nutrition:  Health Habits/Nutrition  Do you exercise for at least 20 minutes 2-3 times per week?: Yes  Have you lost any weight without trying in the past 3 months?: No  Do you eat fewer than 2 meals per day?: No  Have you seen a dentist within the past year?: (!) No(list of dentisit given to patient)  Body mass index: 20.75  Health Habits/Nutrition Interventions:  · Dental exam overdue:  patient encouraged to make appointment with his/her dentist    Safety:  Safety  Do you have working smoke detectors?: Yes  Have all throw rugs been removed or fastened?: Yes  Do you have non-slip mats or surfaces in all bathtubs/showers?: Yes  Do all of your stairways have a railing or banister?: Yes  Are your doorways, halls and stairs free of clutter?: Yes  Do you always fasten your seatbelt when you are in a car?: (!) No  Safety Interventions:  · Home safety tips provided    ADL:  ADLs  In the past 7 days, did you need help from others to perform any of the following everyday activities?  Eating, dressing, grooming, bathing, toileting, or walking/balance?: None  In the past 7 days, did you need help from others to take care of any of the following? Laundry, housekeeping, banking/finances, shopping, telephone use, food preparation, transportation, or taking medications?: (!) Housekeeping, Transportation, Taking Medications(group home lady that is assigned to his case helps him)  ADL Interventions:  · Patient declines any further evaluation/treatment for this issue    Personalized Preventive Plan   Current Health Maintenance Status    There is no immunization history on file for this patient. Health Maintenance   Topic Date Due    HIV screen  03/10/2005    Annual Wellness Visit (AWV)  06/23/2019    Flu vaccine (1) 12/15/2020 (Originally 9/1/2020)    Pneumococcal 0-64 years Vaccine (1 of 1 - PPSV23) 03/15/2021 (Originally 3/10/1996)    DTaP/Tdap/Td vaccine (1 - Tdap) 09/15/2021 (Originally 3/10/2009)    Hepatitis A vaccine  Aged Out    Hepatitis B vaccine  Aged Out    Hib vaccine  Aged Out    Meningococcal (ACWY) vaccine  Aged Out    Varicella vaccine  Discontinued     Recommendations for [place] Due: see orders and patient instructions/AVS.  . Recommended screening schedule for the next 5-10 years is provided to the patient in written form: see Patient Instructions/AVS.    Noemi DAN LPN, 97/37/2086, performed the documented evaluation under the direct supervision of the attending physician.

## 2020-11-16 NOTE — PATIENT INSTRUCTIONS
Personalized Preventive Plan for [facility] 11/16/2020  Medicare offers a range of preventive health benefits. Some of the tests and screenings are paid in full while other may be subject to a deductible, co-insurance, and/or copay. Some of these benefits include a comprehensive review of your medical history including lifestyle, illnesses that may run in your family, and various assessments and screenings as appropriate. After reviewing your medical record and screening and assessments performed today your provider may have ordered immunizations, labs, imaging, and/or referrals for you. A list of these orders (if applicable) as well as your Preventive Care list are included within your After Visit Summary for your review. Other Preventive Recommendations:    · A preventive eye exam performed by an eye specialist is recommended every 1-2 years to screen for glaucoma; cataracts, macular degeneration, and other eye disorders. · A preventive dental visit is recommended every 6 months. · Try to get at least 150 minutes of exercise per week or 10,000 steps per day on a pedometer . · Order or download the FREE \"Exercise & Physical Activity: Your Everyday Guide\" from [employer]. Call 7-383.325.2786 or search [employer] online. · You need 9549-9695 mg of calcium and 2224-4482 IU of vitamin D per day. It is possible to meet your calcium requirement with diet alone, but a vitamin D supplement is usually necessary to meet this goal.  · When exposed to the sun, use a sunscreen that protects against both UVA and UVB radiation with an SPF of 30 or greater. Reapply every 2 to 3 hours or after sweating, drying off with a towel, or swimming. · Always wear a seat belt when traveling in a car. Always wear a helmet when riding a bicycle or motorcycle.   Personalized Preventive Plan for [facility] [place] - 11/16/2020  Medicare offers a range of preventive health benefits. Some of the tests and screenings are paid in full while other may be subject to a deductible, co-insurance, and/or copay. Some of these benefits include a comprehensive review of your medical history including lifestyle, illnesses that may run in your family, and various assessments and screenings as appropriate. After reviewing your medical record and screening and assessments performed today your provider may have ordered immunizations, labs, imaging, and/or referrals for you. A list of these orders (if applicable) as well as your Preventive Care list are included within your After Visit Summary for your review. Other Preventive Recommendations:    A preventive eye exam performed by an eye specialist is recommended every 1-2 years to screen for glaucoma; cataracts, macular degeneration, and other eye disorders. A preventive dental visit is recommended every 6 months. Try to get at least 150 minutes of exercise per week or 10,000 steps per day on a pedometer . Order or download the FREE \"Exercise & Physical Activity: Your Everyday Guide\" from [employer]. Call 2-699.635.5000 or search [employer] online. You need 2618-7444 mg of calcium and 7810-7854 IU of vitamin D per day. It is possible to meet your calcium requirement with diet alone, but a vitamin D supplement is usually necessary to meet this goal.  When exposed to the sun, use a sunscreen that protects against both UVA and UVB radiation with an SPF of 30 or greater. Reapply every 2 to 3 hours or after sweating, drying off with a towel, or swimming. Always wear a seat belt when traveling in a car. Always wear a helmet when riding a bicycle or motorcycle.

## 2020-12-15 ENCOUNTER — HOSPITAL ENCOUNTER (EMERGENCY)
Age: 30
Discharge: HOME | End: 2020-12-15
Attending: EMERGENCY MEDICINE
Payer: COMMERCIAL

## 2020-12-15 VITALS
HEIGHT: 77 IN | HEART RATE: 77 BPM | OXYGEN SATURATION: 97 % | SYSTOLIC BLOOD PRESSURE: 135 MMHG | WEIGHT: 172 LBS | BODY MASS INDEX: 20.31 KG/M2 | RESPIRATION RATE: 16 BRPM | TEMPERATURE: 97.2 F | DIASTOLIC BLOOD PRESSURE: 80 MMHG

## 2020-12-15 PROCEDURE — 99282 EMERGENCY DEPT VISIT SF MDM: CPT

## 2020-12-15 NOTE — ED NOTES
[address] with cab voucher after D/C from ED   [employer] contacted      MARCELA Pimentel  12/15/20 7012

## 2020-12-15 NOTE — ED PROVIDER NOTES
[facility]  Emergency Department Encounter  Emergency Medicine Resident         This patient was evaluated in the Emergency Department for symptoms described in the history of present illness. He/she was evaluated in the context of the global COVID-19 pandemic, which necessitated consideration that the patient might be at risk for infection with the SARS-CoV-2 virus that causes COVID-19. Institutional protocols and algorithms that pertain to the evaluation of patients at risk for COVID-19 are in a state of rapid change based on information released by regulatory bodies including the CDC and federal and state organizations. These policies and algorithms were followed during the patient's care in the ED. Pt Name: Jayleen Villavicencio  MRN: [id number]  [place] 1990  Date of evaluation: 12/15/20  PCP:  Radha Lott MD    [address] [place] [address]       Chief Complaint   Patient presents with    Fatigue       HISTORY OF PRESENT ILLNESS  (Location/Symptom, Timing/Onset, Context/Setting, Quality, Duration, Modifying Factors, Severity.)    Jayleen Villavicencio is a 27 y.o. male who presents with desire to obtain a doctor's recommendations for physical therapy and chiropractic referral.  Patient states that he has had fatigue with associated deconditioning for the past year since pandemic started and he wants to get back into playing \"hoops\" feels out of shape. As there are several options she does not know which to pick. He denies any other complaints. Denies any new symptoms. PAST MEDICAL / SURGICAL / SOCIAL / FAMILY HISTORY    has a past medical history of Depression, Schizophrenia ([address] and Scoliosis.     Denies [facility]    Social History     Socioeconomic History    Marital status: Single     Spouse name: Not on file    Number of children: Not on file    Years of education: Not on file    Highest education level: Not on file   Occupational History    Not on file   Social Needs    Financial resource strain: Not on file    Food insecurity     Worry: Not on file     Inability: Not on file    Transportation needs     Medical: Not on file     Non-medical: Not on file   Tobacco Use    Smoking status: Current Every Day Smoker     Packs/day: 1.00     Years: 18.00     Pack years: 18.00    Smokeless tobacco: Never Used    Tobacco comment: pt is accepting of nicotine replacement    Substance and Sexual Activity    Alcohol use: Not Currently     Alcohol/week: 0.0 standard drinks     Comment: Beer    Drug use: No     Comment: Denies drug use.  Sexual activity: Yes     Partners: Female   Lifestyle    Physical activity     Days per week: Not on file     Minutes per session: Not on file    Stress: Not on file   Relationships    Social connections     Talks on phone: Not on file     Gets together: Not on file     Attends Spiritism service: Not on file     Active member of club or organization: Not on file     Attends meetings of clubs or organizations: Not on file     Relationship status: Not on file    Intimate partner violence     Fear of current or ex partner: Not on file     Emotionally abused: Not on file     Physically abused: Not on file     Forced sexual activity: Not on file   Other Topics Concern    Not on file   Social History Narrative    Not on file       Family History   Problem Relation Age of Onset    Schizophrenia Mother     No Known Problems Father        Allergies:    Grass extracts [gramineae pollens]    Home Medications:  Prior to Admission medications    Medication Sig Start Date End Date Taking? Authorizing Provider   clonazePAM (KLONOPIN) 0.5 MG tablet Take 0.5 mg by mouth nightly.  7/27/20   Historical Provider, MD   ABILIFY MAINTENA 300 MG PRSY prefilled syringe  7/13/20   Historical Provider, MD   cloZAPine (CLOZARIL) 25 MG tablet  8/13/20   Historical Provider, MD   INGREZZA 80 MG CAPS  7/20/20   Historical Provider, MD   loratadine (CLARITIN) 10 MG tablet 8/13/20   Historical Provider, MD   traZODone (DESYREL) 50 MG tablet  8/13/20   Historical Provider, MD   cloZAPine (CLOZARIL) 50 MG tablet Take 3 tablets by mouth nightly 6/17/20   Quinn Arce MD       REVIEW OF SYSTEMS    (2-9 systems for level 4, 10 or more for level 5)    A 10 point review of systems was performed and negative unless otherwise specified in HPI  Gen: No Fever, No chills  EYES: No blurry visiion, no double vision  HENT: No sore throat, No runny nose. No cough  CV: No CP , No palpitation  RESP: No SOB, No respiratory distress  GI: No N/V, No Abdm pain  : No dysuria  SKIN: No rash  MSK: No back pain, no joint pain  NEURO: No HA, no weakness  PSYCH: No SI/HI        PHYSICAL EXAM   (up to 7 for level 4, 8 or more for level 5)     INITIAL VITALS:     /80   Pulse 77   Temp 97.2 °F (36.2 °C)   Resp 16   Ht 6' 5\" (1.956 m)   Wt 172 lb (78 kg)   SpO2 97%   BMI 20.40 kg/m²     Physical Exam  GENERAL: upon initial examination, patient is well appearing, nontoxic, and not in acute respiratory distress. Does have tic. HENT: normocephalic , nose normal,   EYES: no occular discharge, no scleral icterus  NECK: no JVD, no tracheal deviation  CV: Normal S1 S2, no MRG  PULM / CHEST: CTA Bilaterally all fields, no WRR  ABDOMEN: SNTND, No peritoneal signs  MSK: no gross deformity, no edema, no TTP  NEURO: 5/5 str in BUE f/e . 5/5 str in BLE hip leg foot f/e. No FSD. Nonataxic or widebased gait. No edema in LE. No unilat calf ttp. SKIN: no rash, no erythema, cap refill < 2 sec      DIFFERENTIAL  DIAGNOSIS/ MDM   PLAN (LABS / IMAGING / EKG):  No orders of the defined types were placed in this encounter. MEDICATIONS ORDERED:  No orders of the defined types were placed in this encounter. MDM:    Bobby Odell is a 27 y.o. male who presents with desire for referral to a physical therapist and chiropractor to help him get into shape so that he can play hoops again with his friends. Information has been provided. As he does not endorse any new complaints or any worsening and this is his only concern tonight we will discharged no need for laboratory imaging. EMERGENCY DEPARTMENT COURSE:         DIAGNOSTIC RESULTS / EMERGENCYDEPARTMENT COURSE   LABS:  Labs Reviewed - No data to display    RADIOLOGY:  No results found. CONSULTS:  None    CRITICAL CARE:  Please see attending note    FINAL IMPRESSION     1. Need for referral to physical therapist          DISPOSITION / PLAN   DISPOSITION Decision To Discharge 12/15/2020 12:45:31 AM       Evaluation and treatment course in the ED, and plan of care upon discharge was discussed in length with the patient. Patient had no further questions prior to being discharged and was instructed to return to the ED for new or worsening symptoms. Any changes to existing medications or new prescriptions were reviewed with patient and they expressed understanding of how to correctly take their medications and the possible common side effects. The patient understands that at this time there is no evidence for a more malignant underlying process, but the patient also understands that early in the process of an illness or injury, an emergency department workup can be falsely reassuring. Routine discharge counseling was given, and the patient understands that worsening, changing or persistent symptoms should prompt an immediate call or follow up with his/her primary physician or return to the emergency department. The importance of appropriate follow up was also discussed. More extensive discharge instructions were given in the patients discharge paperwork. PATIENT REFERRED TO:  Emile Tao MD  [address]   [address]  [id number] [phone]    Schedule an appointment as soon as possible for a visit in 2 days  Return to the ER if worsening or any other concern    [facility] [address] [place] [address] Kat Avila

## 2020-12-15 NOTE — ED PROVIDER NOTES
[facility] ED  Emergency Department  Faculty Attestation       I performed a history and physical examination of the patient and discussed management with the resident. I reviewed the residents note and agree with the documented findings including all diagnostic interpretations and plan of care. Any areas of disagreement are noted on the chart. I was personally present for the key portions of any procedures. I have documented in the chart those procedures where I was not present during the key portions. I have reviewed the emergency nurses triage note. I agree with the chief complaint, past medical history, past surgical history, allergies, medications, social and family history as documented unless otherwise noted below. Documentation of the HPI, Physical Exam and Medical Decision Making performed by carlosibkaci is based on my personal performance of the HPI, PE and MDM. For Physician Assistant/ Nurse Practitioner cases/documentation I have personally evaluated this patient and have completed at least one if not all key elements of the E/M (history, physical exam, and MDM). Additional findings are as noted. Pertinent Comments     Primary Care Physician: Alis Mabry MD    ED Triage Vitals   BP Temp Temp src Pulse Resp SpO2 Height Weight   12/15/20 0038 12/15/20 0024 -- 12/15/20 0038 12/15/20 0038 12/15/20 0038 12/15/20 0038 12/15/20 0038   135/80 97.2 °F (36.2 °C)  77 16 97 % 6' 5\" (1.956 m) 172 lb (78 kg)        History/Physical: This is a 27 y.o. male who presents to the Emergency Department with complaint of just feeling overall like he is a chiropractor and physical therapy person to help him with all of his pain from his head to his hips so that he can start playing hoops again. No other complaints. On exam sitting on cot no acute distress. Heart sounds regular lungs clear to auscultation. Abdomen soft alert and oriented. Normal strength in upper and lower extremities.   Normal gait.    MDM/Plan: Patient with complaint that he wants a referral.  Will give information for referrals he has no other complaints at this time and no acute findings on exam.      CRITICAL CARE: None     Kathrine Combs MD  Attending Emergency Physician         Kathrine Combs MD  12/15/20 [id number]

## 2021-02-27 ENCOUNTER — HOSPITAL ENCOUNTER (EMERGENCY)
Age: 31
Discharge: HOME | End: 2021-02-27
Attending: EMERGENCY MEDICINE
Payer: COMMERCIAL

## 2021-02-27 VITALS
WEIGHT: 180 LBS | OXYGEN SATURATION: 99 % | HEART RATE: 92 BPM | SYSTOLIC BLOOD PRESSURE: 117 MMHG | BODY MASS INDEX: 21.25 KG/M2 | RESPIRATION RATE: 18 BRPM | TEMPERATURE: 98.4 F | DIASTOLIC BLOOD PRESSURE: 73 MMHG | HEIGHT: 77 IN

## 2021-02-27 DIAGNOSIS — F20.9 SCHIZOPHRENIA, UNSPECIFIED TYPE (HCC): Primary | ICD-10-CM

## 2021-02-27 PROCEDURE — 99283 EMERGENCY DEPT VISIT LOW MDM: CPT

## 2021-02-27 ASSESSMENT — ENCOUNTER SYMPTOMS
FACIAL SWELLING: 0
SORE THROAT: 0
COUGH: 0
NAUSEA: 0
RHINORRHEA: 0
DIARRHEA: 0
SHORTNESS OF BREATH: 0
CONSTIPATION: 0
COLOR CHANGE: 0
BLOOD IN STOOL: 0
TROUBLE SWALLOWING: 0
EYE REDNESS: 0
EYE DISCHARGE: 0
ABDOMINAL PAIN: 0
WHEEZING: 0
EYE PAIN: 0
VOMITING: 0
CHEST TIGHTNESS: 0
BACK PAIN: 0
SINUS PRESSURE: 0

## 2021-02-28 ENCOUNTER — HOSPITAL ENCOUNTER (EMERGENCY)
Age: 31
Discharge: HOME | End: 2021-02-28
Attending: EMERGENCY MEDICINE
Payer: COMMERCIAL

## 2021-02-28 ENCOUNTER — APPOINTMENT (OUTPATIENT)
Dept: GENERAL RADIOLOGY | Age: 31
End: 2021-02-28
Payer: COMMERCIAL

## 2021-02-28 VITALS
DIASTOLIC BLOOD PRESSURE: 83 MMHG | RESPIRATION RATE: 16 BRPM | BODY MASS INDEX: 21.25 KG/M2 | WEIGHT: 180 LBS | TEMPERATURE: 98.4 F | OXYGEN SATURATION: 95 % | HEART RATE: 78 BPM | HEIGHT: 77 IN | SYSTOLIC BLOOD PRESSURE: 128 MMHG

## 2021-02-28 DIAGNOSIS — M79.671 RIGHT FOOT PAIN: Primary | ICD-10-CM

## 2021-02-28 DIAGNOSIS — M79.671 FOOT PAIN, RIGHT: ICD-10-CM

## 2021-02-28 PROCEDURE — 73630 X-RAY EXAM OF FOOT: CPT

## 2021-02-28 PROCEDURE — 99283 EMERGENCY DEPT VISIT LOW MDM: CPT

## 2021-02-28 PROCEDURE — 6370000000 HC RX 637 (ALT 250 FOR IP): Performed by: STUDENT IN AN ORGANIZED HEALTH CARE EDUCATION/TRAINING PROGRAM

## 2021-02-28 RX ORDER — IBUPROFEN 400 MG/1
400 TABLET ORAL ONCE
Status: COMPLETED | OUTPATIENT
Start: 2021-02-28 | End: 2021-02-28

## 2021-02-28 RX ORDER — IBUPROFEN 400 MG/1
400 TABLET ORAL EVERY 6 HOURS PRN
Qty: 12 TABLET | Refills: 0 | Status: SHIPPED | OUTPATIENT
Start: 2021-02-28 | End: 2021-03-03

## 2021-02-28 RX ORDER — ACETAMINOPHEN 500 MG
500 TABLET ORAL EVERY 6 HOURS PRN
Qty: 12 TABLET | Refills: 5 | Status: SHIPPED | OUTPATIENT
Start: 2021-02-28 | End: 2021-03-03

## 2021-02-28 RX ORDER — ACETAMINOPHEN 500 MG
1000 TABLET ORAL ONCE
Status: COMPLETED | OUTPATIENT
Start: 2021-02-28 | End: 2021-02-28

## 2021-02-28 RX ADMIN — ACETAMINOPHEN 1000 MG: 500 TABLET ORAL at 19:53

## 2021-02-28 RX ADMIN — IBUPROFEN 400 MG: 400 TABLET, FILM COATED ORAL at 19:53

## 2021-02-28 ASSESSMENT — PAIN DESCRIPTION - LOCATION: LOCATION: FOOT

## 2021-02-28 ASSESSMENT — ENCOUNTER SYMPTOMS
COUGH: 0
NAUSEA: 0
EYE PAIN: 0
SORE THROAT: 0
DIARRHEA: 0
ABDOMINAL PAIN: 0
SINUS PAIN: 0
VOMITING: 0
SHORTNESS OF BREATH: 0

## 2021-02-28 ASSESSMENT — PAIN DESCRIPTION - ORIENTATION: ORIENTATION: RIGHT

## 2021-02-28 ASSESSMENT — PAIN SCALES - GENERAL: PAINLEVEL_OUTOF10: 5

## 2021-02-28 ASSESSMENT — PAIN DESCRIPTION - PAIN TYPE: TYPE: ACUTE PAIN

## 2021-02-28 NOTE — ED PROVIDER NOTES
[address] [facility]  EMERGENCY DEPARTMENT ENCOUNTER      Pt Name: Marylee Felty  MRN: [id number]  [place] 1990  Date of evaluation: 2/27/21      CHIEF COMPLAINT       Chief Complaint   Patient presents with    Hallucinations         [address], [address] is a 27 y.o. male who presents complaining of Psychiatric Evaluation. Patient called the police to [facility] because he is hearing voices. Patient has a history of schizophrenia and states he is taking his medications. Patient states that he always hears voices and he told his psychiatrist and evidently they have made some changes to his meds which he states he is taking. Patient states that they are just annoying. Patient denies any suicidal or homicidal ideation. Patient denies somatic complaints. Patient denies drug or alcohol abuse. REVIEW OF SYSTEMS       Review of Systems   Constitutional: Negative for activity change, appetite change, chills, diaphoresis and fever. HENT: Negative for congestion, ear pain, facial swelling, nosebleeds, rhinorrhea, sinus pressure, sore throat and trouble swallowing. Eyes: Negative for pain, discharge and redness. Respiratory: Negative for cough, chest tightness, shortness of breath and wheezing. Cardiovascular: Negative for chest pain, palpitations and leg swelling. Gastrointestinal: Negative for abdominal pain, blood in stool, constipation, diarrhea, nausea and vomiting. Genitourinary: Negative for difficulty urinating, dysuria, flank pain, frequency, genital sores and hematuria. Musculoskeletal: Negative for arthralgias, back pain, gait problem, joint swelling, myalgias and neck pain. Skin: Negative for color change, pallor, rash and wound. Neurological: Negative for dizziness, tremors, seizures, syncope, speech difficulty, weakness, numbness and headaches. Psychiatric/Behavioral: Positive for hallucinations.  Negative for confusion, Normal breath sounds. No wheezing or rales. Neurological:      Mental Status: He is alert and oriented to person, place, and time. Psychiatric:         Mood and Affect: Affect is flat. Speech: Speech is rapid and pressured. Behavior: Behavior is hyperactive. Thought Content: Thought content does not include homicidal or suicidal ideation. DIAGNOSTIC RESULTS     LABS: All lab results were reviewed by myself, and all abnormals are listed below. Labs Reviewed - No data to display      MEDICAL DECISION MAKING:     Patient is medically cleared. I do not believe this patient has any admission criteria we will have social work talk to make sure he does not give them a different history than what he gave me. EMERGENCY DEPARTMENT COURSE:   Vitals:    Vitals:    02/27/21 2020   Pulse: 92   Resp: 18   Temp: 98.4 °F (36.9 °C)   TempSrc: Oral   SpO2: 99%   Weight: 180 lb (81.6 kg)   Height: 6' 5\" (1.956 m)       The patient was given the following medications while in the emergency department:  No orders of the defined types were placed in this encounter. -------------------------  8:23 PM EST  Patient has been evaluated does not meet admission criteria and is okay going back to his group home and will follow up with the [facility] urgent care if need be. FINAL IMPRESSION      1. Schizophrenia, unspecified type [facility])          DISPOSITION/PLAN   DISPOSITION Decision To Discharge 02/27/2021 08:07:30 PM      PATIENT REFERREDTO:  Courtney Meade MD  [id number]   [place] [address]  [phone]    In 1 week      [facility]  [facility]  [address] [address]  [phone]    If symptoms worsen      DISCHARGEMEDICATIONS:  Current Discharge Medication List          (Please note that portions of this note were completed with a voice recognition program.  Efforts were made to edit thedictations but occasionally words are mis-transcribed.)    Abelardo Diehl MD  Attending Emergency Physician                     Shukri Swain MD  02/27/21 2079

## 2021-02-28 NOTE — ED NOTES
[facility] back to pt's group home. SW encouraged pt to return to the hospital or call 911 if pt's symptoms worsen or pt is having thoughts of wanting to harm self/others. Pt verbalized pt's understanding. SW encouraged pt to follow up with the [facility] and/or [facility] urgent [facility]. SW provided pt with pt's belongings and discharge paperwork. Pt cooperatively exited the [facility].

## 2021-02-28 NOTE — ED NOTES
Babs Chakraborty is a 27year old male who presents to the ED via [facility]. Pt contacted 911 stating pt is hearing voices and needs to go to the hospital for help. Pt denies SI/HI. Pt reports \"the voices just irritate me. \" Pt denies command hallucinations. Pt reports the voices speak negative towards. Pt currently resides in group home and reports liking living there. Pt is linked with [facility] and follows up there on a weekly basis. Pt recently had a new medication added to pt's med regmine. Pt has been compliant taking pt's medications, per pt. Pt denies substance abuse. Pt reports sleeping and eating good on a daily basis. SW consulted with ED Dr. ED Dr and SW agree pt is safe to be discharged home. Pt is not a risk to self or others at this time. Pt denies SI/HI. Pt agreeable to be discharged home and follow up outpatient services.

## 2021-03-01 NOTE — ED PROVIDER NOTES
[facility]  ED  Emergency Department Encounter  EmergencyMedicineResident     This patient was seen during the COVID-19 crisis. There were limited resources and those resources we did have had to be conserved for the sickest of patients. Pt Name: Mitchell Galicia  MRN: [id number]  [place] 1990  Date of evaluation: 2/28/21  PCP: Munira Galvin MD    [address] [place] [address]       Chief Complaint   Patient presents with    Foot Pain     right       HISTORY OF PRESENT ILLNESS  (Location/Symptom, Timing/Onset, Context/Setting, Quality, Duration, Modifying Factors, Severity.)      Mitchell Galicia is a 27 y.o. male who presents for evaluation of right foot pain. Patient reports that he slipped and fell yesterday injuring his right foot. Patient states that he also walks long distances every day and has chronic right foot pain. Patient was recently discharged from [facility] psychiatric unit yesterday for schizophrenia episode. Patient states that he does have housing at this time and feels safe at home. Patient states that he did not take anything for pain at this time and has not used any ice or heating pads over the area. PAST MEDICAL / SURGICAL /SOCIAL / FAMILY HISTORY      has a past medical history of Depression, Schizophrenia ([address] and Scoliosis. has no past surgical history on file.       Social History     Socioeconomic History    Marital status: Single     Spouse name: Not on file    Number of children: Not on file    Years of education: Not on file    Highest education level: Not on file   Occupational History    Not on file   Social Needs    Financial resource strain: Not on file    Food insecurity     Worry: Not on file     Inability: Not on file    Transportation needs     Medical: Not on file     Non-medical: Not on file   Tobacco Use    Smoking status: Current Every Day Smoker     Packs/day: 1.00     Years: 18.00     Pack years: 18.00    Smokeless tobacco: Never Used    Tobacco comment: pt is accepting of nicotine replacement    Substance and Sexual Activity    Alcohol use: Not Currently     Alcohol/week: 0.0 standard drinks     Comment: Beer    Drug use: No     Comment: Denies drug use.  Sexual activity: Yes     Partners: Female   Lifestyle    Physical activity     Days per week: Not on file     Minutes per session: Not on file    Stress: Not on file   Relationships    Social connections     Talks on phone: Not on file     Gets together: Not on file     Attends Advent service: Not on file     Active member of club or organization: Not on file     Attends meetings of clubs or organizations: Not on file     Relationship status: Not on file    Intimate partner violence     Fear of current or ex partner: Not on file     Emotionally abused: Not on file     Physically abused: Not on file     Forced sexual activity: Not on file   Other Topics Concern    Not on file   Social History Narrative    Not on file       Family History   Problem Relation Age of Onset    Schizophrenia Mother     No Known Problems Father        Allergies:  Grass extracts [gramineae pollens]    Home Medications:  Prior to Admission medications    Medication Sig Start Date End Date Taking? Authorizing Provider   acetaminophen (TYLENOL) 500 MG tablet Take 1 tablet by mouth every 6 hours as needed for Pain 2/28/21 3/3/21 Yes Anuradha Leong MD   ibuprofen (IBU) 400 MG tablet Take 1 tablet by mouth every 6 hours as needed for Pain 2/28/21 3/3/21 Yes Anuradha Leong MD   clonazePAM (KLONOPIN) 0.5 MG tablet Take 0.5 mg by mouth nightly.  7/27/20   Historical Provider, MD   ABILIFY MAINTENA 300 MG PRSY prefilled syringe  7/13/20   Historical Provider, MD   cloZAPine (CLOZARIL) 25 MG tablet  8/13/20   Historical Provider, MD   INGREZZA 80 MG CAPS  7/20/20   Historical Provider, MD   loratadine (CLARITIN) 10 MG tablet  8/13/20   Historical Provider, MD   traZODone (DESYREL) 50 MG tablet  8/13/20   Historical Provider, MD   cloZAPine (CLOZARIL) 50 MG tablet Take 3 tablets by mouth nightly 6/17/20   Cassi Hays MD       REVIEW OF SYSTEMS    (2-9 systems for level 4, 10 or more forlevel 5)      Review of Systems   Constitutional: Negative for activity change, chills and fever. HENT: Negative for congestion, sinus pain and sore throat. Eyes: Negative for pain and visual disturbance. Respiratory: Negative for cough and shortness of breath. Cardiovascular: Negative for chest pain. Gastrointestinal: Negative for abdominal pain, diarrhea, nausea and vomiting. Genitourinary: Negative for difficulty urinating, dysuria and hematuria. Musculoskeletal:        Right foot pain, injury   Skin: Negative for rash and wound. Neurological: Negative for dizziness, light-headedness and headaches. Psychiatric/Behavioral: Negative for agitation and confusion. PHYSICAL EXAM   (up to 7 for level 4, 8 or more forlevel 5)      ED TRIAGE VITALS BP: 128/83, Temp: 98.4 °F (36.9 °C), Pulse: 78, Resp: 16, SpO2: 95 %    Vitals:    02/28/21 1914 02/28/21 1920   BP:  128/83   Pulse:  78   Resp:  16   Temp: 98.4 °F (36.9 °C)    TempSrc: Temporal    SpO2:  95%   Weight:  180 lb (81.6 kg)   Height:  6' 5\" (1.956 m)         Physical Exam  Vitals signs and nursing note reviewed. Constitutional:       Appearance: Normal appearance. HENT:      Head: Normocephalic and atraumatic. Nose: Nose normal.      Mouth/Throat:      Mouth: Mucous membranes are moist.   Eyes:      Extraocular Movements: Extraocular movements intact. Pupils: Pupils are equal, round, and reactive to light. Neck:      Musculoskeletal: Normal range of motion. Cardiovascular:      Rate and Rhythm: Normal rate and regular rhythm. Pulses: Normal pulses. Heart sounds: Normal heart sounds. Pulmonary:      Effort: Pulmonary effort is normal.      Breath sounds: Normal breath sounds.    Abdominal:      General: Abdomen is flat. Palpations: Abdomen is soft. Musculoskeletal:      Comments: Tenderness palpation over the first and second metatarsals of the right foot, cervical spine dystocia   Skin:     General: Skin is warm and dry. Capillary Refill: Capillary refill takes less than 2 seconds. Neurological:      General: No focal deficit present. Mental Status: He is alert and oriented to person, place, and time. Psychiatric:         Mood and Affect: Mood normal.         Behavior: Behavior normal.           DIFFERENTIAL  DIAGNOSIS     PLAN (LABS / IMAGING / EKG):  Orders Placed This Encounter   Procedures    XR FOOT LEFT (MIN 3 VIEWS)       MEDICATIONS ORDERED:  ED Medication Orders (From admission, onward)    Start Ordered     Status Ordering Provider    02/28/21 2000 02/28/21 1950  acetaminophen (TYLENOL) tablet 1,000 mg  ONCE      Last MAR action: Given - by Lara Guzman on 02/28/21 at [facility] 2    02/28/21 2000 02/28/21 1950  ibuprofen (ADVIL;MOTRIN) tablet 400 mg  ONCE      Last MAR action: Given - by Lara Guzman on 02/28/21 at [address]          DDX: Coker's neuroma, stress fracture of the metatarsal, right foot strain    DIAGNOSTIC RESULTS / [facility]     IMPRESSION & INITIAL PLAN:  This a 27-year-old male presenting returned today for evaluation of a right foot injury after reportedly slipping and falling injuring his right foot. Patient states that he walks an excessive amount during the day he thinks that may be contributing to his symptoms. Symptomatic therapy including ibuprofen Tylenol administered in the emergency department today and a left foot x-ray was obtained which was negative for acute osseous abnormalities. Patient really was complaining of right foot pain to me however when the x-ray tech came and he was complaining of left foot pain in the left foot was x-rayed instead of the right.   Patient was noted to walk out of the emergency department without any difficulty bearing weight on either foot. Likely that is schizophrenia was playing a role in him telling multiple providers different things. On physical exam there is no point tenderness to palpation there is apparently a stress fracture as he did report an injury with excessive weightbearing to the feet. Patient was encouraged to perform the rice therapy and follow-up with his PCP at his earliest availability for continuation of care. LABS:  No results found for this visit on 02/28/21. RADIOLOGY:  XR FOOT LEFT (MIN 3 VIEWS)   Final Result   No acute osseous abnormality. CONSULTS:  None    CRITICAL CARE:  See attending physician note    FINAL IMPRESSION      1. Right foot pain          DISPOSITION / PLAN     DISPOSITION Decision To Discharge 02/28/2021 08:11:07 PM      PATIENT REFERRED TO:  Ilana Torrez MD  [address]   [address]  [address]  [phone]    In 1 day      [facility]  [address] [address]  [phone]    If symptoms worsen      DISCHARGE MEDICATIONS:  Discharge Medication List as of 2/28/2021  8:14 PM      START taking these medications    Details   acetaminophen (TYLENOL) 500 MG tablet Take 1 tablet by mouth every 6 hours as needed for Pain, Disp-12 tablet, R-5Print      ibuprofen (IBU) 400 MG tablet Take 1 tablet by mouth every 6 hours as needed for Pain, Disp-12 tablet, R-0Print           Discharge Medication List as of 2/28/2021  8:14 PM           Shelbie Woods MD  Emergency Medicine Resident    (Please note that portions of this note were completed with a voice recognition program.  Efforts were made to edit the dictations but occasionally words are mis-transcribed.)       Shelbie Woods MD  Resident  02/28/21 2034       Shelbie Woods MD  Resident  02/28/21 1042

## 2021-03-06 NOTE — ED PROVIDER NOTES
[facility]  ED  eMERGENCY dEPARTMENT eNCOUnter   Attending Attestation     Pt Name: Miriam Ryan  MRN: [id number]  [place] 1990  Date of evaluation: 3/5/21       Miriam Ryan is a 27 y.o. male who presents with Foot Pain (right)      Xr Foot Right (min 3 Views)    Result Date: 2/28/2021  EXAMINATION: THREE XRAY VIEWS OF THE LEFT FOOT 2/28/2021 4:42 pm COMPARISON: None. HISTORY: ORDERING SYSTEM PROVIDED HISTORY: stress fx 1-2 metatarsal TECHNOLOGIST PROVIDED HISTORY: stress fx 1-2 metatarsal Reason for Exam: foot pain FINDINGS: There is no evidence of acute fracture. There is normal alignment of the tarsometatarsal joints. No acute joint abnormality. No focal osseous lesion. No focal soft tissue abnormality. No acute osseous abnormality. I performed a history and physical examination of the patient and discussed management with the resident. I reviewed the residents note and agree with the documented findings and plan of care. Any areas of disagreement are noted on the chart. I was personally present for the key portions of any procedures. I have documented in the chart those procedures where I was not present during the key portions. I have personally reviewed all images and agree with the resident's interpretation. I have reviewed the emergency nurses triage note. I agree with the chief complaint, past medical history, past surgical history, allergies, medications, social and family history as documented unless otherwise noted below. Documentation of the HPI, Physical Exam and Medical Decision Making performed by medical students or scribes is based on my personal performance of the HPI, PE and MDM. For Phys Assistant/ Nurse Practitioner cases/documentation I have had a face to face evaluation of this patient and have completed at least one if not all key elements of the E/M (history, physical exam, and MDM). Additional findings are as noted.     For APC cases I have personally evaluated and examined the patient in conjunction with the APC and agree with the treatment plan and disposition of the patient as recorded by the APC.     Johnny Jackson MD  Attending Emergency  Physician        Ariela Yarbrough MD  03/05/21 5648

## 2024-10-24 ENCOUNTER — HOSPITAL ENCOUNTER (OUTPATIENT)
Age: 34
Discharge: HOME | End: 2024-10-24
Payer: COMMERCIAL

## 2024-10-24 LAB
CRP SERPL HS-MCNC: <3 MG/L (ref 0–5)
LITHIUM LEVEL: <0.1 MMOL/L (ref 0.6–1.2)
TROPONIN I SERPL HS-MCNC: 7 NG/L (ref 0–22)

## 2024-10-24 PROCEDURE — 36415 COLL VENOUS BLD VENIPUNCTURE: CPT

## 2024-10-24 PROCEDURE — 80178 ASSAY OF LITHIUM: CPT

## 2024-10-24 PROCEDURE — 86140 C-REACTIVE PROTEIN: CPT

## 2024-10-24 PROCEDURE — 84484 ASSAY OF TROPONIN QUANT: CPT
